# Patient Record
Sex: FEMALE | Race: WHITE | Employment: FULL TIME | URBAN - METROPOLITAN AREA
[De-identification: names, ages, dates, MRNs, and addresses within clinical notes are randomized per-mention and may not be internally consistent; named-entity substitution may affect disease eponyms.]

---

## 2017-01-17 RX ORDER — FLUOXETINE 10 MG/1
TABLET, FILM COATED ORAL
Qty: 90 TABLET | Refills: 0 | Status: SHIPPED | OUTPATIENT
Start: 2017-01-17 | End: 2017-06-19 | Stop reason: ALTCHOICE

## 2017-02-27 ENCOUNTER — TELEPHONE (OUTPATIENT)
Dept: INTERNAL MEDICINE CLINIC | Facility: CLINIC | Age: 22
End: 2017-02-27

## 2017-05-01 RX ORDER — NORETHINDRONE ACETATE AND ETHINYL ESTRADIOL AND FERROUS FUMARATE 1MG-20(21)
KIT ORAL
Qty: 28 TABLET | Refills: 0 | Status: SHIPPED | OUTPATIENT
Start: 2017-05-01 | End: 2017-05-28

## 2017-05-30 RX ORDER — NORETHINDRONE ACETATE AND ETHINYL ESTRADIOL 1MG-20(21)
1 KIT ORAL
Qty: 28 TABLET | Refills: 0 | Status: SHIPPED | OUTPATIENT
Start: 2017-05-30 | End: 2017-06-19 | Stop reason: ALTCHOICE

## 2017-05-30 RX ORDER — NORETHINDRONE ACETATE AND ETHINYL ESTRADIOL AND FERROUS FUMARATE 1MG-20(21)
KIT ORAL
Qty: 28 TABLET | Refills: 5 | Status: SHIPPED | OUTPATIENT
Start: 2017-05-30 | End: 2017-06-19

## 2017-05-30 NOTE — TELEPHONE ENCOUNTER
Pt's mother is requesting refill on her Birth control pill. Mother will inform daughter to call in to make an appt.

## 2017-06-11 ENCOUNTER — HOSPITAL ENCOUNTER (OUTPATIENT)
Age: 22
Discharge: HOME | End: 2017-06-11
Payer: MEDICAID

## 2017-06-11 VITALS
OXYGEN SATURATION: 100 % | HEART RATE: 98 BPM | TEMPERATURE: 98 F | RESPIRATION RATE: 20 BRPM | HEIGHT: 64 IN | SYSTOLIC BLOOD PRESSURE: 106 MMHG | DIASTOLIC BLOOD PRESSURE: 58 MMHG | WEIGHT: 110 LBS | BODY MASS INDEX: 18.78 KG/M2

## 2017-06-11 DIAGNOSIS — J02.0 STREP PHARYNGITIS: Primary | ICD-10-CM

## 2017-06-11 PROCEDURE — 87430 STREP A AG IA: CPT

## 2017-06-11 PROCEDURE — 99213 OFFICE O/P EST LOW 20 MIN: CPT

## 2017-06-11 PROCEDURE — 99214 OFFICE O/P EST MOD 30 MIN: CPT

## 2017-06-11 RX ORDER — AMOXICILLIN 875 MG/1
875 TABLET, COATED ORAL 2 TIMES DAILY
Qty: 20 TABLET | Refills: 0 | Status: SHIPPED | OUTPATIENT
Start: 2017-06-11 | End: 2017-06-19 | Stop reason: ALTCHOICE

## 2017-06-11 RX ORDER — DEXAMETHASONE 4 MG/1
8 TABLET ORAL ONCE
Status: COMPLETED | OUTPATIENT
Start: 2017-06-11 | End: 2017-06-11

## 2017-06-11 NOTE — ED PROVIDER NOTES
Patient Seen in: 5 [facility]    History   Patient presents with:  Sore Throat  Cough/URI    Stated Complaint: Cough, Sore Throat    HPI    Patient is a 28-year-old female who presents for evaluation of sore throat ×4-5 day Status: Never Used                        Alcohol Use: No                Review of Systems   Constitutional: Negative. HENT: Positive for sore throat. Respiratory: Positive for cough. Cardiovascular: Negative. Gastrointestinal: Negative. decadron here. Amoxicillin dispensed. Take medication as directed. Warm salt water gargles. Tylenol or Motrin as needed for pain\fever. Change out toothbrush after taking antibiotic for 24 hours. Cover mouth when coughing or sneezing.   Wash hands often

## 2017-06-11 NOTE — ED INITIAL ASSESSMENT (HPI)
REPORTS SORE THROAT SINCE Wednesday. NOW C/O INTERMITTENTLY PRODUCTIVE COUGH AS WELL. DENIES FEVERS AT HOME. REPORTS UPPER CHEST DISCOMFORT ASSOCIATED WITH HER COUGH.

## 2017-06-19 ENCOUNTER — OFFICE VISIT (OUTPATIENT)
Dept: INTERNAL MEDICINE CLINIC | Facility: CLINIC | Age: 22
End: 2017-06-19

## 2017-06-19 VITALS
SYSTOLIC BLOOD PRESSURE: 110 MMHG | HEART RATE: 69 BPM | BODY MASS INDEX: 19.06 KG/M2 | HEIGHT: 64.5 IN | DIASTOLIC BLOOD PRESSURE: 74 MMHG | RESPIRATION RATE: 17 BRPM | WEIGHT: 113 LBS | OXYGEN SATURATION: 99 %

## 2017-06-19 DIAGNOSIS — Z30.41 USES ORAL CONTRACEPTION: Primary | ICD-10-CM

## 2017-06-19 PROCEDURE — 81025 URINE PREGNANCY TEST: CPT | Performed by: FAMILY MEDICINE

## 2017-06-19 PROCEDURE — 87591 N.GONORRHOEAE DNA AMP PROB: CPT | Performed by: FAMILY MEDICINE

## 2017-06-19 PROCEDURE — 99213 OFFICE O/P EST LOW 20 MIN: CPT | Performed by: FAMILY MEDICINE

## 2017-06-19 PROCEDURE — 87491 CHLMYD TRACH DNA AMP PROBE: CPT | Performed by: FAMILY MEDICINE

## 2017-06-19 RX ORDER — NORETHINDRONE ACETATE AND ETHINYL ESTRADIOL 1MG-20(21)
1 KIT ORAL
Qty: 28 TABLET | Refills: 11 | Status: SHIPPED | OUTPATIENT
Start: 2017-06-19 | End: 2018-05-02

## 2017-06-19 NOTE — PROGRESS NOTES
CC:  Contraception      Hx of CC:    Here for refill birth control  Using condoms  Periods light and short on pill  No complaints on pill  Has had HPV     Goes to depaul      Allergies:  No Known Allergies   Current Meds:    Current Outpatient Prescription oral contraception  Is on last week of pill pack  1 yr refill  Needs pap next year  Encouraged cont to use condoms   - Norethin Ace-Eth Estrad-FE (MICROGESTIN FE 1/20) 1-20 MG-MCG Oral Tab; Take 1 tablet by mouth once daily. Dispense: 28 tablet;  Refill: 1

## 2018-04-04 ENCOUNTER — OFFICE VISIT (OUTPATIENT)
Dept: INTERNAL MEDICINE CLINIC | Facility: CLINIC | Age: 23
End: 2018-04-04

## 2018-04-04 VITALS
BODY MASS INDEX: 19.93 KG/M2 | SYSTOLIC BLOOD PRESSURE: 100 MMHG | DIASTOLIC BLOOD PRESSURE: 58 MMHG | TEMPERATURE: 98 F | HEART RATE: 61 BPM | WEIGHT: 118.19 LBS | HEIGHT: 64.5 IN | OXYGEN SATURATION: 100 %

## 2018-04-04 DIAGNOSIS — L70.9 ACNE, UNSPECIFIED ACNE TYPE: Primary | ICD-10-CM

## 2018-04-04 PROCEDURE — 99213 OFFICE O/P EST LOW 20 MIN: CPT | Performed by: INTERNAL MEDICINE

## 2018-04-04 RX ORDER — DOXYCYCLINE HYCLATE 100 MG
100 TABLET ORAL 2 TIMES DAILY
Qty: 60 TABLET | Refills: 1 | Status: SHIPPED | OUTPATIENT
Start: 2018-04-04 | End: 2018-05-02

## 2018-04-04 NOTE — PATIENT INSTRUCTIONS
Controlling Adult or Adolescent Acne     Look for water-based, oil-free skin care products. These are less likely to clog your pores. You stand the best chance of controlling your acne if you follow your treatment plan. Be patient.  Acne often takes mon · Don’t squeeze or pick blemishes. Acne blemishes may heal on their own. But squeezing can cause scarring. Scars will remain after acne blemishes heal.  · Sponges, brushes, or other abrasive tools can irritate the skin. Avoid using them.   · If you use soft 2. Follow the treatment plan advised by your healthcare provider. It usually takes 2 to 3 months to see a result. 3. Switching from oil-based to water-based makeup may improve acne in girls.   Follow-up care  Follow up with your healthcare provider, or as

## 2018-04-04 NOTE — PROGRESS NOTES
Candy Mathews is a 25year old female.     Chief complaint: acne     HPI:   25year old female with PMh as listed below here for   Acne  Couple of years   Had used topical treatment before but didn't improve and made it worse   Has been using Proactiv apparent distress  SKIN: no rashes,no suspicious lesions  HEENT: atraumatic, normocephalic,ears and throat are clear  NECK: supple,no adenopathy  LUNGS: clear to auscultation  CARDIO: RRR without murmur  GI: good BS's,no masses, HSM or tenderness  EXTREMIT

## 2018-05-02 ENCOUNTER — TELEPHONE (OUTPATIENT)
Dept: INTERNAL MEDICINE CLINIC | Facility: CLINIC | Age: 23
End: 2018-05-02

## 2018-05-02 ENCOUNTER — OFFICE VISIT (OUTPATIENT)
Dept: INTERNAL MEDICINE CLINIC | Facility: CLINIC | Age: 23
End: 2018-05-02

## 2018-05-02 VITALS
BODY MASS INDEX: 19.73 KG/M2 | WEIGHT: 117 LBS | HEIGHT: 64.5 IN | TEMPERATURE: 98 F | SYSTOLIC BLOOD PRESSURE: 102 MMHG | HEART RATE: 79 BPM | DIASTOLIC BLOOD PRESSURE: 58 MMHG | OXYGEN SATURATION: 100 %

## 2018-05-02 DIAGNOSIS — F41.9 ANXIETY: ICD-10-CM

## 2018-05-02 DIAGNOSIS — Z00.00 ANNUAL PHYSICAL EXAM: Primary | ICD-10-CM

## 2018-05-02 DIAGNOSIS — Z12.4 SCREENING FOR CERVICAL CANCER: ICD-10-CM

## 2018-05-02 DIAGNOSIS — N83.209 CYST OF OVARY, UNSPECIFIED LATERALITY: ICD-10-CM

## 2018-05-02 DIAGNOSIS — Z30.41 USES ORAL CONTRACEPTION: ICD-10-CM

## 2018-05-02 DIAGNOSIS — D64.9 ANEMIA, UNSPECIFIED TYPE: Primary | ICD-10-CM

## 2018-05-02 DIAGNOSIS — D64.9 ANEMIA, UNSPECIFIED TYPE: ICD-10-CM

## 2018-05-02 PROCEDURE — 85025 COMPLETE CBC W/AUTO DIFF WBC: CPT | Performed by: INTERNAL MEDICINE

## 2018-05-02 PROCEDURE — 84443 ASSAY THYROID STIM HORMONE: CPT | Performed by: INTERNAL MEDICINE

## 2018-05-02 PROCEDURE — 82607 VITAMIN B-12: CPT | Performed by: INTERNAL MEDICINE

## 2018-05-02 PROCEDURE — 99395 PREV VISIT EST AGE 18-39: CPT | Performed by: INTERNAL MEDICINE

## 2018-05-02 PROCEDURE — 80048 BASIC METABOLIC PNL TOTAL CA: CPT | Performed by: INTERNAL MEDICINE

## 2018-05-02 PROCEDURE — 84466 ASSAY OF TRANSFERRIN: CPT | Performed by: INTERNAL MEDICINE

## 2018-05-02 PROCEDURE — 82728 ASSAY OF FERRITIN: CPT | Performed by: INTERNAL MEDICINE

## 2018-05-02 PROCEDURE — 87591 N.GONORRHOEAE DNA AMP PROB: CPT | Performed by: INTERNAL MEDICINE

## 2018-05-02 PROCEDURE — 80061 LIPID PANEL: CPT | Performed by: INTERNAL MEDICINE

## 2018-05-02 PROCEDURE — 82746 ASSAY OF FOLIC ACID SERUM: CPT | Performed by: INTERNAL MEDICINE

## 2018-05-02 PROCEDURE — 83540 ASSAY OF IRON: CPT | Performed by: INTERNAL MEDICINE

## 2018-05-02 PROCEDURE — 87491 CHLMYD TRACH DNA AMP PROBE: CPT | Performed by: INTERNAL MEDICINE

## 2018-05-02 RX ORDER — NORETHINDRONE ACETATE AND ETHINYL ESTRADIOL 1MG-20(21)
1 KIT ORAL
Qty: 28 TABLET | Refills: 11 | Status: SHIPPED | OUTPATIENT
Start: 2018-05-02 | End: 2019-04-25

## 2018-05-02 RX ORDER — ALPRAZOLAM 0.25 MG/1
0.25 TABLET ORAL EVERY 6 HOURS PRN
Qty: 30 TABLET | Refills: 1 | Status: SHIPPED | OUTPATIENT
Start: 2018-05-02 | End: 2018-09-09

## 2018-05-02 RX ORDER — DOXYCYCLINE HYCLATE 100 MG
100 TABLET ORAL 2 TIMES DAILY
Qty: 60 TABLET | Refills: 1 | Status: SHIPPED | OUTPATIENT
Start: 2018-05-02 | End: 2018-08-08

## 2018-05-02 RX ORDER — FLUOXETINE 20 MG/1
20 TABLET, FILM COATED ORAL DAILY
Qty: 90 TABLET | Refills: 0 | Status: SHIPPED | OUTPATIENT
Start: 2018-05-02 | End: 2018-05-15

## 2018-05-15 ENCOUNTER — TELEPHONE (OUTPATIENT)
Dept: INTERNAL MEDICINE CLINIC | Facility: CLINIC | Age: 23
End: 2018-05-15

## 2018-05-15 DIAGNOSIS — N83.209 CYST OF OVARY, UNSPECIFIED LATERALITY: Primary | ICD-10-CM

## 2018-05-15 NOTE — TELEPHONE ENCOUNTER
PA REQUEST FOR FLUOXETINE- PER DR garcia changing med to Sertraline 50 mg PO Daily- [facility]

## 2018-05-30 ENCOUNTER — HOSPITAL ENCOUNTER (OUTPATIENT)
Dept: ULTRASOUND IMAGING | Facility: HOSPITAL | Age: 23
Discharge: HOME | End: 2018-05-30
Attending: INTERNAL MEDICINE
Payer: MEDICAID

## 2018-05-30 DIAGNOSIS — N83.209 CYST OF OVARY, UNSPECIFIED LATERALITY: ICD-10-CM

## 2018-05-30 PROCEDURE — 76830 TRANSVAGINAL US NON-OB: CPT | Performed by: INTERNAL MEDICINE

## 2018-05-30 PROCEDURE — 76856 US EXAM PELVIC COMPLETE: CPT | Performed by: INTERNAL MEDICINE

## 2018-06-14 ENCOUNTER — TELEPHONE (OUTPATIENT)
Dept: INTERNAL MEDICINE CLINIC | Facility: CLINIC | Age: 23
End: 2018-06-14

## 2018-06-14 NOTE — TELEPHONE ENCOUNTER
Mother stated that the daughter was experiencing the nausea and vomiting before she left for [facility] but they did not call or schedule appointment to address

## 2018-06-14 NOTE — TELEPHONE ENCOUNTER
Patient mother calling, patient is in [facility] experiencing nausea and vomiting. Advised she should be seen in [facility] for the nausea and vomiting as we cannot properly diagnose without seeing her.  Nausea and  Vomiting are side effects for every medications and

## 2018-07-05 ENCOUNTER — TELEPHONE (OUTPATIENT)
Dept: INTERNAL MEDICINE CLINIC | Facility: CLINIC | Age: 23
End: 2018-07-05

## 2018-07-05 NOTE — TELEPHONE ENCOUNTER
Pt named above mother Judson Gonzalez is calling again in reason to a previous message for a medication change to pt ALPRAZolam 0.25 MG over to [address] and from a tablet to a capsule. pt made aware that  will be informed and may not be able to get prescr

## 2018-07-05 NOTE — TELEPHONE ENCOUNTER
Pt named above mother Shalini Camargo is calling for a refill on ALPRAZolam 0.25 MG, but in capsules not tablet. States that her daughter is completely out and can no longer afford the medication through [facility] because her insurance is no longer covering it.  Try

## 2018-07-09 RX ORDER — ALPRAZOLAM 0.25 MG/1
0.25 TABLET ORAL EVERY 6 HOURS PRN
Qty: 45 TABLET | Refills: 0 | Status: SHIPPED | OUTPATIENT
Start: 2018-07-09 | End: 2018-08-08

## 2018-07-09 NOTE — TELEPHONE ENCOUNTER
Called the patients mother Terrance Counter no answer VM left   prescription was sent this morning to the needed pharmacy and left a vm that is not available in capsule form only tabs

## 2018-07-09 NOTE — TELEPHONE ENCOUNTER
PLEASE change prescription to capsules and it needs to go to the  [facility] due to cost.  Patients mom upset and wants the prescription today.

## 2018-07-09 NOTE — TELEPHONE ENCOUNTER
Placed the order in   Can you please call the patient and check with her if that what she wants since mom is requesting medication and she is 25year old

## 2018-07-10 ENCOUNTER — TELEPHONE (OUTPATIENT)
Dept: INTERNAL MEDICINE CLINIC | Facility: CLINIC | Age: 23
End: 2018-07-10

## 2018-07-10 RX ORDER — FLUOXETINE HYDROCHLORIDE 20 MG/1
20 CAPSULE ORAL DAILY
Qty: 90 CAPSULE | Refills: 0 | Status: SHIPPED | OUTPATIENT
Start: 2018-07-10 | End: 2018-10-12

## 2018-07-10 NOTE — TELEPHONE ENCOUNTER
Pt's mother Marcelo Almaraz called request RX for Fluoxetine 20 MG capsules. Takes 1 tablet daily Pharmacy: [address] [facility] in [facility] [address] Per mother at [address] medication  is $4. For 1 month supply.  Please send rx

## 2018-08-08 NOTE — PROGRESS NOTES
Ameena Razo is a 25year old female.     Chief complaint: follow up on acne  , depression and anxiety and medication refill     HPI:     25year old female with PMH as listed below here for   Medication refill and follow up on anxiety and depression Disorder Neg      Patient Active Problem List:     Routine infant or child health check     Acne     Myopia with astigmatism      REVIEW OF SYSTEMS:   A comprehensive 10 point review of systems was completed.   Pertinent positives and negatives noted in the

## 2018-08-09 ENCOUNTER — TELEPHONE (OUTPATIENT)
Dept: INTERNAL MEDICINE CLINIC | Facility: CLINIC | Age: 23
End: 2018-08-09

## 2018-08-09 NOTE — TELEPHONE ENCOUNTER
Which shots does she need ? We need either to review the records or she needs to ask her pediatrician which shots she is missing please ask the patient.

## 2018-09-04 ENCOUNTER — HOSPITAL ENCOUNTER (OUTPATIENT)
Age: 23
Discharge: HOME | End: 2018-09-04
Payer: MEDICAID

## 2018-09-04 VITALS
OXYGEN SATURATION: 99 % | BODY MASS INDEX: 18.1 KG/M2 | TEMPERATURE: 97 F | HEART RATE: 73 BPM | WEIGHT: 106 LBS | RESPIRATION RATE: 18 BRPM | DIASTOLIC BLOOD PRESSURE: 53 MMHG | SYSTOLIC BLOOD PRESSURE: 94 MMHG | HEIGHT: 64 IN

## 2018-09-04 DIAGNOSIS — R59.1 LYMPHADENOPATHY: Primary | ICD-10-CM

## 2018-09-04 LAB
#LYMPHOCYTE IC: 1.8 X10ˆ3/UL (ref 0.9–3.2)
#MXD IC: 0.3 X10ˆ3/UL (ref 0.1–1)
#NEUTROPHIL IC: 5.5 X10ˆ3/UL (ref 1.3–6.7)
HCT IC: 30.9 % (ref 37–54)
HGB IC: 10.5 G/DL (ref 12–16)
LYMPHOCYTES NFR BLD AUTO: 23.1 %
MCH IC: 29.7 PG (ref 27–33.2)
MCHC IC: 34 G/DL (ref 31–37)
MCV IC: 87.5 FL (ref 81–100)
MIXED CELL %: 4.6 %
NEUTROPHILS NFR BLD AUTO: 72.3 %
PLT IC: 299 X10ˆ3/UL (ref 150–450)
RBC IC: 3.53 X10ˆ6/UL (ref 3.8–5.1)
S PYO AG THROAT QL: NEGATIVE
WBC IC: 7.6 X10ˆ3/UL (ref 4–13)

## 2018-09-04 PROCEDURE — 36415 COLL VENOUS BLD VENIPUNCTURE: CPT

## 2018-09-04 PROCEDURE — 86308 HETEROPHILE ANTIBODY SCREEN: CPT | Performed by: NURSE PRACTITIONER

## 2018-09-04 PROCEDURE — 99213 OFFICE O/P EST LOW 20 MIN: CPT

## 2018-09-04 PROCEDURE — 85025 COMPLETE CBC W/AUTO DIFF WBC: CPT | Performed by: NURSE PRACTITIONER

## 2018-09-04 PROCEDURE — 87430 STREP A AG IA: CPT

## 2018-09-04 PROCEDURE — 99214 OFFICE O/P EST MOD 30 MIN: CPT

## 2018-09-04 RX ORDER — AMOXICILLIN AND CLAVULANATE POTASSIUM 875; 125 MG/1; MG/1
1 TABLET, FILM COATED ORAL 2 TIMES DAILY
Qty: 20 TABLET | Refills: 0 | Status: SHIPPED | OUTPATIENT
Start: 2018-09-04 | End: 2018-09-14

## 2018-09-04 NOTE — ED PROVIDER NOTES
Patient presents with:  Sore Throat      HPI:     Tatiana Kim is a 25year old female who presents for evaluation of a chief complaint of right-sided neck lymphadenopathy that she noticed a couple days ago.   She states the lymph node is uncomfortab good skin turgor, no obvious rashes  NECK: supple, right sided neck lymphadenopathy. Mobile. Mild discomfort with palpation. No visible redness.    CARDIO: RRR without murmur  EXTREMITIES: no cyanosis, clubbing or edema  GI: soft, non-tender, normal bowel s the course of Augmentin and if the Monospot is negative, she will need further testing done at that time including a possible biopsy. Diagnosis:    ICD-10-CM    1. Lymphadenopathy R59.1        All results reviewed and discussed with patient.   See AVS fo

## 2018-09-05 LAB — HETEROPH AB SER QL: NEGATIVE

## 2018-09-12 RX ORDER — ALPRAZOLAM 0.25 MG/1
TABLET ORAL
Qty: 45 TABLET | Refills: 0 | Status: SHIPPED | OUTPATIENT
Start: 2018-09-12 | End: 2018-10-31

## 2018-10-15 RX ORDER — FLUOXETINE HYDROCHLORIDE 20 MG/1
CAPSULE ORAL
Qty: 90 CAPSULE | Refills: 1 | Status: SHIPPED | OUTPATIENT
Start: 2018-10-15 | End: 2018-11-28

## 2018-10-31 ENCOUNTER — TELEPHONE (OUTPATIENT)
Dept: INTERNAL MEDICINE CLINIC | Facility: CLINIC | Age: 23
End: 2018-10-31

## 2018-11-05 RX ORDER — ALPRAZOLAM 0.25 MG/1
TABLET ORAL
Qty: 90 TABLET | Refills: 0 | Status: SHIPPED | OUTPATIENT
Start: 2018-11-05 | End: 2018-11-07

## 2018-11-07 ENCOUNTER — TELEPHONE (OUTPATIENT)
Dept: INTERNAL MEDICINE CLINIC | Facility: CLINIC | Age: 23
End: 2018-11-07

## 2018-11-07 RX ORDER — ALPRAZOLAM 0.25 MG/1
TABLET ORAL
Qty: 90 TABLET | Refills: 0 | Status: SHIPPED | OUTPATIENT
Start: 2018-11-07 | End: 2018-11-28

## 2018-11-08 RX ORDER — DOXYCYCLINE HYCLATE 100 MG
TABLET ORAL
Qty: 90 TABLET | Refills: 0 | Status: SHIPPED | OUTPATIENT
Start: 2018-11-08 | End: 2019-07-02 | Stop reason: ALTCHOICE

## 2018-11-28 ENCOUNTER — OFFICE VISIT (OUTPATIENT)
Dept: INTERNAL MEDICINE CLINIC | Facility: CLINIC | Age: 23
End: 2018-11-28
Payer: MEDICAID

## 2018-11-28 VITALS
SYSTOLIC BLOOD PRESSURE: 100 MMHG | DIASTOLIC BLOOD PRESSURE: 50 MMHG | BODY MASS INDEX: 17.37 KG/M2 | HEART RATE: 81 BPM | RESPIRATION RATE: 15 BRPM | HEIGHT: 64.5 IN | OXYGEN SATURATION: 99 % | WEIGHT: 103 LBS

## 2018-11-28 DIAGNOSIS — F32.A ANXIETY AND DEPRESSION: ICD-10-CM

## 2018-11-28 DIAGNOSIS — Z76.0 MEDICATION REFILL: Primary | ICD-10-CM

## 2018-11-28 DIAGNOSIS — L70.9 ACNE, UNSPECIFIED ACNE TYPE: ICD-10-CM

## 2018-11-28 DIAGNOSIS — R53.83 OTHER FATIGUE: ICD-10-CM

## 2018-11-28 DIAGNOSIS — D64.9 ANEMIA, UNSPECIFIED TYPE: ICD-10-CM

## 2018-11-28 DIAGNOSIS — F41.9 ANXIETY AND DEPRESSION: ICD-10-CM

## 2018-11-28 DIAGNOSIS — D50.9 IRON DEFICIENCY ANEMIA, UNSPECIFIED IRON DEFICIENCY ANEMIA TYPE: ICD-10-CM

## 2018-11-28 PROCEDURE — 99214 OFFICE O/P EST MOD 30 MIN: CPT | Performed by: INTERNAL MEDICINE

## 2018-11-28 PROCEDURE — 83540 ASSAY OF IRON: CPT | Performed by: INTERNAL MEDICINE

## 2018-11-28 PROCEDURE — 84466 ASSAY OF TRANSFERRIN: CPT | Performed by: INTERNAL MEDICINE

## 2018-11-28 PROCEDURE — 82306 VITAMIN D 25 HYDROXY: CPT | Performed by: INTERNAL MEDICINE

## 2018-11-28 PROCEDURE — 82784 ASSAY IGA/IGD/IGG/IGM EACH: CPT | Performed by: INTERNAL MEDICINE

## 2018-11-28 PROCEDURE — 82728 ASSAY OF FERRITIN: CPT | Performed by: INTERNAL MEDICINE

## 2018-11-28 PROCEDURE — 83516 IMMUNOASSAY NONANTIBODY: CPT | Performed by: INTERNAL MEDICINE

## 2018-11-28 PROCEDURE — 85025 COMPLETE CBC W/AUTO DIFF WBC: CPT | Performed by: INTERNAL MEDICINE

## 2018-11-28 RX ORDER — ALPRAZOLAM 0.25 MG/1
TABLET ORAL
Qty: 90 TABLET | Refills: 0 | Status: SHIPPED | OUTPATIENT
Start: 2018-12-07 | End: 2019-02-22

## 2018-11-28 RX ORDER — FLUOXETINE HYDROCHLORIDE 20 MG/1
CAPSULE ORAL
Qty: 90 CAPSULE | Refills: 3 | Status: SHIPPED | OUTPATIENT
Start: 2018-11-28 | End: 2019-06-26

## 2018-11-28 NOTE — PROGRESS NOTES
Jassi James is a 25year old female.     Chief complaint:   follow up on depression , medication refill, fatigue , acne  HPI:     25year old female with PMH as listed below here for   Follow up on depression  Feeling much better   No si or  HI   On Passed away due to an accident in 1997   • Diabetes Neg         NG: No family history of diabetes    • Heart Attack Neg         NG: No history of early cardiac disease/sudden death    • Glaucoma Neg    • Macular degeneration Neg    • Heart Disorder Neg type  Continue doxy     4. Medication refill  Refilled prozac and xanax       5.  Anxiety and depression  Continue prozac   Xanax refill given     Please return to the clinic if you are having persistent or worsening symptoms   Efren Jules MD,   Diplomat

## 2018-12-03 RX ORDER — ERGOCALCIFEROL 1.25 MG/1
50000 CAPSULE ORAL WEEKLY
Qty: 12 CAPSULE | Refills: 1 | Status: SHIPPED | OUTPATIENT
Start: 2018-12-03 | End: 2018-12-15

## 2018-12-10 ENCOUNTER — LAB ENCOUNTER (OUTPATIENT)
Dept: LAB | Facility: HOSPITAL | Age: 23
End: 2018-12-10
Attending: INTERNAL MEDICINE
Payer: MEDICAID

## 2018-12-10 ENCOUNTER — OFFICE VISIT (OUTPATIENT)
Dept: HEMATOLOGY/ONCOLOGY | Facility: HOSPITAL | Age: 23
End: 2018-12-10
Attending: INTERNAL MEDICINE
Payer: MEDICAID

## 2018-12-10 VITALS
HEIGHT: 64.5 IN | SYSTOLIC BLOOD PRESSURE: 105 MMHG | WEIGHT: 104 LBS | BODY MASS INDEX: 17.54 KG/M2 | HEART RATE: 70 BPM | DIASTOLIC BLOOD PRESSURE: 46 MMHG | TEMPERATURE: 98 F | RESPIRATION RATE: 16 BRPM

## 2018-12-10 DIAGNOSIS — D64.9 NORMOCYTIC ANEMIA: Primary | ICD-10-CM

## 2018-12-10 DIAGNOSIS — D64.9 NORMOCYTIC ANEMIA: ICD-10-CM

## 2018-12-10 PROCEDURE — 85025 COMPLETE CBC W/AUTO DIFF WBC: CPT

## 2018-12-10 PROCEDURE — 86039 ANTINUCLEAR ANTIBODIES (ANA): CPT

## 2018-12-10 PROCEDURE — 36415 COLL VENOUS BLD VENIPUNCTURE: CPT

## 2018-12-10 PROCEDURE — 99244 OFF/OP CNSLTJ NEW/EST MOD 40: CPT | Performed by: INTERNAL MEDICINE

## 2018-12-10 PROCEDURE — 85060 BLOOD SMEAR INTERPRETATION: CPT

## 2018-12-10 PROCEDURE — 86235 NUCLEAR ANTIGEN ANTIBODY: CPT

## 2018-12-10 PROCEDURE — 86225 DNA ANTIBODY NATIVE: CPT

## 2018-12-10 PROCEDURE — 83615 LACTATE (LD) (LDH) ENZYME: CPT

## 2018-12-10 PROCEDURE — 83010 ASSAY OF HAPTOGLOBIN QUANT: CPT

## 2018-12-10 PROCEDURE — 84550 ASSAY OF BLOOD/URIC ACID: CPT

## 2018-12-10 PROCEDURE — 86880 COOMBS TEST DIRECT: CPT

## 2018-12-10 PROCEDURE — 86038 ANTINUCLEAR ANTIBODIES: CPT

## 2018-12-10 PROCEDURE — 85045 AUTOMATED RETICULOCYTE COUNT: CPT

## 2018-12-11 NOTE — PROGRESS NOTES
Hematology Consultation Note    Patient Name: Angelita Arriola   YOB: 1995   Medical Record Number: [id number]   CSN: [id number]   Consulting Physician: Nichelle Reid MD  Referring Physician(s): Governor Joseph MD  Date of Consultation: 1 Blehpharitis, OU, 2011   • Chiari malformation [facility])     surgical decompression d/t severe headaches   • Eyelashes turned in     NG: Aberant lashes, OU, 9/26/11; Management: Removed in office, OU, 2011   • Infectious mononucleosis    • Keratitis 2011    NG Stress Concern: Not Asked        Weight Concern: Not Asked    Social History Narrative      Emily Barrera is single. She works part time at [employer] and goes to college full time 10 [employer].  She lives in [facility] 47.2 kg (104 lb)   BMI 17.58 kg/m²     Physical Examination:    General: Patient is alert and oriented x 3, not in acute distress. Psych: Anxious initially and then relaxed with cooperative questions and responses  HEENT: EOMs intact. Oropharynx is clear. chronic for the last two years; suspect this is related to natural menstrual blood loss with iron supplements from her birth control pill improving her iron results    --discussed with patient and her mother that her right neck lymphadenopathy episode in S the end of the consultation. Emotional Well Being:    The patient's emotional well being was assessed and resources were discussed. Appropriate resources were reviewed and discussed with the pateint and family.      I spent 60 minutes face to face with

## 2018-12-17 ENCOUNTER — TELEPHONE (OUTPATIENT)
Dept: HEMATOLOGY/ONCOLOGY | Facility: HOSPITAL | Age: 23
End: 2018-12-17

## 2018-12-17 NOTE — TELEPHONE ENCOUNTER
LM to please call our office back to review lab results and make f/u appt in 3 months with repeat CBC.

## 2018-12-19 ENCOUNTER — TELEPHONE (OUTPATIENT)
Dept: HEMATOLOGY/ONCOLOGY | Facility: HOSPITAL | Age: 23
End: 2018-12-19

## 2018-12-19 NOTE — TELEPHONE ENCOUNTER
Returned patient call- per Dr. Lamonte Montana labs results neg. LIZ positive- with reasonable LIZ titer- recommend talk to PCP and possible referral to Rheumatology for follow up. Patient stated understanding.   Follow up appointment scheduled for 3 months - davonte

## 2018-12-24 ENCOUNTER — OFFICE VISIT (OUTPATIENT)
Dept: INTERNAL MEDICINE CLINIC | Facility: CLINIC | Age: 23
End: 2018-12-24
Payer: MEDICAID

## 2018-12-24 VITALS
HEIGHT: 64.5 IN | HEART RATE: 71 BPM | OXYGEN SATURATION: 99 % | BODY MASS INDEX: 17.2 KG/M2 | DIASTOLIC BLOOD PRESSURE: 60 MMHG | WEIGHT: 102 LBS | SYSTOLIC BLOOD PRESSURE: 100 MMHG

## 2018-12-24 DIAGNOSIS — Z71.2 ENCOUNTER TO DISCUSS TEST RESULTS: ICD-10-CM

## 2018-12-24 DIAGNOSIS — R76.8 POSITIVE ANA (ANTINUCLEAR ANTIBODY): Primary | ICD-10-CM

## 2018-12-24 DIAGNOSIS — R11.2 NAUSEA AND VOMITING, INTRACTABILITY OF VOMITING NOT SPECIFIED, UNSPECIFIED VOMITING TYPE: ICD-10-CM

## 2018-12-24 PROCEDURE — 99213 OFFICE O/P EST LOW 20 MIN: CPT | Performed by: INTERNAL MEDICINE

## 2018-12-24 NOTE — PROGRESS NOTES
Angella Segura is a 25year old female.     Chief complaint:   discuss test results, need for referrals, vomiting     HPI:        25year old female with PMH as listed below here for   Discuss test results   Patient had seen gastro and hematology   Had History:  Social History    Tobacco Use      Smoking status: Never Smoker      Smokeless tobacco: Never Used    Alcohol use: No      Alcohol/week: 0.0 oz    Drug use: No       Family History   Problem Relation Age of Onset   • Other (Other) Father the [employer]

## 2018-12-26 ENCOUNTER — TELEPHONE (OUTPATIENT)
Dept: INTERNAL MEDICINE CLINIC | Facility: CLINIC | Age: 23
End: 2018-12-26

## 2018-12-26 DIAGNOSIS — R76.8 POSITIVE ANA (ANTINUCLEAR ANTIBODY): Primary | ICD-10-CM

## 2018-12-26 DIAGNOSIS — Z83.2 FAMILY HISTORY OF AUTOIMMUNE DISORDER: ICD-10-CM

## 2019-01-02 ENCOUNTER — OFFICE VISIT (OUTPATIENT)
Dept: RHEUMATOLOGY | Facility: CLINIC | Age: 24
End: 2019-01-02
Payer: MEDICAID

## 2019-01-02 ENCOUNTER — LAB ENCOUNTER (OUTPATIENT)
Dept: LAB | Facility: HOSPITAL | Age: 24
End: 2019-01-02
Attending: INTERNAL MEDICINE
Payer: MEDICAID

## 2019-01-02 VITALS
DIASTOLIC BLOOD PRESSURE: 55 MMHG | BODY MASS INDEX: 17.4 KG/M2 | HEIGHT: 64.5 IN | SYSTOLIC BLOOD PRESSURE: 95 MMHG | WEIGHT: 103.19 LBS | HEART RATE: 62 BPM

## 2019-01-02 DIAGNOSIS — R53.82 CHRONIC FATIGUE: ICD-10-CM

## 2019-01-02 DIAGNOSIS — R76.8 POSITIVE ANA (ANTINUCLEAR ANTIBODY): Primary | ICD-10-CM

## 2019-01-02 DIAGNOSIS — R76.8 POSITIVE ANA (ANTINUCLEAR ANTIBODY): ICD-10-CM

## 2019-01-02 LAB
ALBUMIN SERPL BCP-MCNC: 3.9 G/DL (ref 3.5–4.8)
ALBUMIN/GLOB SERPL: 1.4 {RATIO} (ref 1–2)
ALP SERPL-CCNC: 46 U/L (ref 32–100)
ALT SERPL-CCNC: 27 U/L (ref 14–54)
ANION GAP SERPL CALC-SCNC: 10 MMOL/L (ref 0–18)
APTT PPP: 27.9 SECONDS (ref 23.2–35.3)
AST SERPL-CCNC: 28 U/L (ref 15–41)
BILIRUB SERPL-MCNC: 0.4 MG/DL (ref 0.3–1.2)
BILIRUB UR QL: NEGATIVE
BUN SERPL-MCNC: 9 MG/DL (ref 8–20)
BUN/CREAT SERPL: 12.5 (ref 10–20)
C3 SERPL-MCNC: 93 MG/DL (ref 88–201)
C4 SERPL-MCNC: 21 MG/DL (ref 18–55)
CALCIUM SERPL-MCNC: 9 MG/DL (ref 8.5–10.5)
CHLORIDE SERPL-SCNC: 107 MMOL/L (ref 95–110)
CO2 SERPL-SCNC: 18 MMOL/L (ref 22–32)
COLOR UR: YELLOW
CONFIRM APTT STACLOT: NEGATIVE
CONFIRM DRVVT: 0.9 S (ref 0–1.1)
CREAT SERPL-MCNC: 0.72 MG/DL (ref 0.5–1.5)
CRP SERPL-MCNC: <0.5 MG/DL (ref 0–0.9)
ERYTHROCYTE [SEDIMENTATION RATE] IN BLOOD: 9 MM/HR (ref 0–20)
GLOBULIN PLAS-MCNC: 2.8 G/DL (ref 2.5–3.7)
GLUCOSE SERPL-MCNC: 108 MG/DL (ref 70–99)
GLUCOSE UR-MCNC: NEGATIVE MG/DL
HGB UR QL STRIP.AUTO: NEGATIVE
KETONES UR-MCNC: NEGATIVE MG/DL
NITRITE UR QL STRIP.AUTO: NEGATIVE
OSMOLALITY UR CALC.SUM OF ELEC: 279 MOSM/KG (ref 275–295)
PATIENT FASTING: YES
PH UR: 5 [PH] (ref 5–8)
POTASSIUM SERPL-SCNC: 3.7 MMOL/L (ref 3.3–5.1)
PROT SERPL-MCNC: 6.7 G/DL (ref 5.9–8.4)
PROT UR-MCNC: NEGATIVE MG/DL
PROTHROMBIN TIME: 14.2 SECONDS (ref 11.8–14.5)
RBC #/AREA URNS AUTO: 1 /HPF
RHEUMATOID FACT SER QL: <5 IU/ML
SODIUM SERPL-SCNC: 135 MMOL/L (ref 136–144)
SP GR UR STRIP: 1.02 (ref 1–1.03)
TSH SERPL-ACNC: 3.01 UIU/ML (ref 0.45–5.33)
UROBILINOGEN UR STRIP-ACNC: <2
VIT C UR-MCNC: NEGATIVE MG/DL
WBC #/AREA URNS AUTO: 3 /HPF

## 2019-01-02 PROCEDURE — 86618 LYME DISEASE ANTIBODY: CPT | Performed by: INTERNAL MEDICINE

## 2019-01-02 PROCEDURE — 99204 OFFICE O/P NEW MOD 45 MIN: CPT | Performed by: INTERNAL MEDICINE

## 2019-01-02 PROCEDURE — 86800 THYROGLOBULIN ANTIBODY: CPT

## 2019-01-02 PROCEDURE — 85613 RUSSELL VIPER VENOM DILUTED: CPT

## 2019-01-02 PROCEDURE — 86235 NUCLEAR ANTIGEN ANTIBODY: CPT

## 2019-01-02 PROCEDURE — 86147 CARDIOLIPIN ANTIBODY EA IG: CPT

## 2019-01-02 PROCEDURE — 36415 COLL VENOUS BLD VENIPUNCTURE: CPT

## 2019-01-02 PROCEDURE — 86663 EPSTEIN-BARR ANTIBODY: CPT

## 2019-01-02 PROCEDURE — 99212 OFFICE O/P EST SF 10 MIN: CPT | Performed by: INTERNAL MEDICINE

## 2019-01-02 PROCEDURE — 81001 URINALYSIS AUTO W/SCOPE: CPT | Performed by: INTERNAL MEDICINE

## 2019-01-02 PROCEDURE — 85598 HEXAGNAL PHOSPH PLTLT NEUTRL: CPT

## 2019-01-02 PROCEDURE — 85610 PROTHROMBIN TIME: CPT

## 2019-01-02 PROCEDURE — 80053 COMPREHEN METABOLIC PANEL: CPT | Performed by: INTERNAL MEDICINE

## 2019-01-02 PROCEDURE — 84165 PROTEIN E-PHORESIS SERUM: CPT

## 2019-01-02 PROCEDURE — 84443 ASSAY THYROID STIM HORMONE: CPT

## 2019-01-02 PROCEDURE — 86160 COMPLEMENT ANTIGEN: CPT | Performed by: INTERNAL MEDICINE

## 2019-01-02 PROCEDURE — 86140 C-REACTIVE PROTEIN: CPT | Performed by: INTERNAL MEDICINE

## 2019-01-02 PROCEDURE — 86146 BETA-2 GLYCOPROTEIN ANTIBODY: CPT | Performed by: INTERNAL MEDICINE

## 2019-01-02 PROCEDURE — 86376 MICROSOMAL ANTIBODY EACH: CPT

## 2019-01-02 PROCEDURE — 86200 CCP ANTIBODY: CPT | Performed by: INTERNAL MEDICINE

## 2019-01-02 PROCEDURE — 85390 FIBRINOLYSINS SCREEN I&R: CPT

## 2019-01-02 PROCEDURE — 86644 CMV ANTIBODY: CPT | Performed by: INTERNAL MEDICINE

## 2019-01-02 PROCEDURE — 86665 EPSTEIN-BARR CAPSID VCA: CPT

## 2019-01-02 PROCEDURE — 86747 PARVOVIRUS ANTIBODY: CPT | Performed by: INTERNAL MEDICINE

## 2019-01-02 PROCEDURE — 86645 CMV ANTIBODY IGM: CPT | Performed by: INTERNAL MEDICINE

## 2019-01-02 PROCEDURE — 86664 EPSTEIN-BARR NUCLEAR ANTIGEN: CPT

## 2019-01-02 PROCEDURE — 85730 THROMBOPLASTIN TIME PARTIAL: CPT

## 2019-01-02 PROCEDURE — 85652 RBC SED RATE AUTOMATED: CPT | Performed by: INTERNAL MEDICINE

## 2019-01-02 PROCEDURE — 86431 RHEUMATOID FACTOR QUANT: CPT | Performed by: INTERNAL MEDICINE

## 2019-01-02 RX ORDER — ERGOCALCIFEROL 1.25 MG/1
CAPSULE ORAL
Refills: 1 | COMMUNITY
Start: 2019-01-01 | End: 2020-10-22

## 2019-01-02 NOTE — PATIENT INSTRUCTIONS
- you were seen today for +LIZ going to do more blood work to r/o lupus  - also going to do some work up to r/o infectious process  - will have you f/u in 2 weeks

## 2019-01-02 NOTE — PROGRESS NOTES
Marquis Schaefer is a 26year old female who presents for Patient presents with:  Abnormal Labs: LIZ +   Fatigue: x 1 yr  Weight Loss: x 6 months  Nausea/vomiting: x 6 months  .    HPI:     I had the pleasure of seeing Marquis Schaefer on 1/2/2019 for evalu PE, miscarriages, serositis, myalgias, joint pain or swelling,  symptoms, rash, psoriasis, acid reflux, dysphagia, chest pain or myalgias. She does report intermittent shortness of breath when going up stairs.     She has a paternal grandmother with lupu history of diabetes    • Heart Attack Neg         NG: No history of early cardiac disease/sudden death    • Glaucoma Neg    • Macular degeneration Neg    • Heart Disorder Neg    • Bleeding Disorders Neg    • Clotting Disorder Neg    • Cancer Neg       Soci palpation  Elbow: FROM, no pain or swelling or warmth on palpation  Shoulders: FROM, no pain or swelling or warmth on palpation  Hip: normal log roll, no lateral hip pain, PADMINI test negative b/l  Knees: FROM, no warmth or effusion present.  No pain with RO 18    Monocytes %      % 4    Eosinophils %      % 1    Basophils %      % 0    Neutrophils Absolute      1.8 - 7.7 K/UL 6.2    Lymphocytes Absolute      1.0 - 4.0 K/UL 1.4    Monocytes Absolute      0.0 - 1.0 K/UL 0.3    Eosinophils Absolute      0.0 - 0.

## 2019-01-03 LAB
CARDIOLIPIN IGG SERPL-ACNC: 1.1 GPL (ref 0–14.9)
CARDIOLIPIN IGM SERPL-ACNC: 3.4 MPL (ref 0–12.4)
EBV AB TO EARLY (D) AG, IGG: 48.9 U/ML
PARVOVIRUS B19 ANTIBODY IGG: 0.26 IV
PARVOVIRUS B19 ANTIBODY IGM: 0.19 IV
SCLERODERMA (SCL-70) (ENA) AB, IGG: 2 AU/ML
THYROGLOBULIN AB: 2.2 IU/ML
THYROID PEROXIDASE (TPO) ANTIBODY: 4.6 IU/ML

## 2019-01-04 LAB
ALBUMIN SERPL ELPH-MCNC: 4.39 G/DL (ref 3.75–5.21)
ALBUMIN/GLOB SERPL: 1.51 {RATIO} (ref 1–2)
ALPHA1 GLOB SERPL ELPH-MCNC: 0.32 G/DL (ref 0.19–0.46)
ALPHA2 GLOB SERPL ELPH-MCNC: 0.77 G/DL (ref 0.48–1.05)
B BURGDOR IGG+IGM SER QL: NEGATIVE
B-GLOBULIN SERPL ELPH-MCNC: 0.75 G/DL (ref 0.68–1.23)
BETA 2 GLYCOPROTEIN 1 AB, IGG: <3.7 U/ML (ref ?–15)
BETA 2 GLYCOPROTEIN 1 AB, IGM: <3.7 U/ML (ref ?–15)
CCP IGG SERPL-ACNC: 1.6 U/ML (ref 0–6.9)
CMV IGG SERPL QL: NEGATIVE
CMV IGM SERPL QL: NEGATIVE
EBV NA IGG SER QL IA: POSITIVE
EBV VCA IGG SER QL IA: POSITIVE
EBV VCA IGM SER QL IA: NEGATIVE
GAMMA GLOB SERPL ELPH-MCNC: 1.07 G/DL (ref 0.62–1.7)
HISTONE AB, IGG: 0.7 UNITS
TOTAL PROTEIN (SPECIAL TESTING): 7.3 G/DL (ref 6.5–9.1)

## 2019-01-08 LAB
ENA SM IGG SER QL: NEGATIVE
ENA SM+RNP AB SER QL: NEGATIVE

## 2019-01-18 ENCOUNTER — OFFICE VISIT (OUTPATIENT)
Dept: RHEUMATOLOGY | Facility: CLINIC | Age: 24
End: 2019-01-18
Payer: MEDICAID

## 2019-01-18 VITALS
HEIGHT: 64.5 IN | DIASTOLIC BLOOD PRESSURE: 57 MMHG | BODY MASS INDEX: 16.7 KG/M2 | WEIGHT: 99 LBS | SYSTOLIC BLOOD PRESSURE: 96 MMHG | HEART RATE: 59 BPM

## 2019-01-18 DIAGNOSIS — B27.90 EBV INFECTION: ICD-10-CM

## 2019-01-18 DIAGNOSIS — R53.83 OTHER FATIGUE: ICD-10-CM

## 2019-01-18 DIAGNOSIS — R76.8 POSITIVE ANA (ANTINUCLEAR ANTIBODY): Primary | ICD-10-CM

## 2019-01-18 PROCEDURE — 99212 OFFICE O/P EST SF 10 MIN: CPT | Performed by: INTERNAL MEDICINE

## 2019-01-18 PROCEDURE — 99213 OFFICE O/P EST LOW 20 MIN: CPT | Performed by: INTERNAL MEDICINE

## 2019-01-18 NOTE — PROGRESS NOTES
Bernadette Brito is a 26year old female. HPI:   Patient presents with: Follow - Up: Labs discussion. Cannot gain weight . I had the pleasure of seeing Bernadette Brito on 1/18/2019 for follow up +LIZ, nausea and fatigue.      Current Medications:  Z stairs. Workup showed negative LIZ panel, normal C3 and C4, normal ESR and CRP, normal rheumatoid factor and CCP. Negative antiphospholipid panel.   She was found to have + EBV AG IgG, + EBV nuclear AG Ab, +EBV AB to early Ag IgG, negative EBV AB IgM HSM  SKIN: No rashes or skin lesions.  No nail findings  MSK:  Cervical spine: FROM  Hands: no synovitis in DIP, PIP and MCP, strong full fists  Wrist: FROM, no pain or swelling or warmth on palpation  Elbow: FROM, no pain or swelling or warmth on palpation Titer/Pattern      <80 640 (A)   Anti-Sjogren's A      Negative Negative   Anti-Sjogren's B      Negative Negative   Anti-Smith Antibody      Negative Negative   Anti-Thapa/RNP Antibody      Negative Negative     Component      Latest Ref Rng & Units 1/2/2 eyes.  In September she did develop left parotid/submandibular swelling, screening test for mono was negative. Further workup was done due to a + LIZ and all blood work for lupus, connective tissue disease and rheumatoid arthritis were negative.   She was

## 2019-01-22 ENCOUNTER — TELEPHONE (OUTPATIENT)
Dept: INTERNAL MEDICINE CLINIC | Facility: CLINIC | Age: 24
End: 2019-01-22

## 2019-01-22 NOTE — TELEPHONE ENCOUNTER
Pt's mother called and states her daughter need to see the GI that Dr Carolina Casey recommended again.  She's wondering if the referral Dr Carolina Casey wrote is good for multiple visits or if she needs a new referral.

## 2019-02-22 DIAGNOSIS — F41.9 ANXIETY AND DEPRESSION: ICD-10-CM

## 2019-02-22 DIAGNOSIS — L70.9 ACNE, UNSPECIFIED ACNE TYPE: ICD-10-CM

## 2019-02-22 DIAGNOSIS — R53.83 OTHER FATIGUE: ICD-10-CM

## 2019-02-22 DIAGNOSIS — Z76.0 MEDICATION REFILL: ICD-10-CM

## 2019-02-22 DIAGNOSIS — F32.A ANXIETY AND DEPRESSION: ICD-10-CM

## 2019-02-22 DIAGNOSIS — D64.9 ANEMIA, UNSPECIFIED TYPE: ICD-10-CM

## 2019-02-23 RX ORDER — ALPRAZOLAM 0.25 MG/1
TABLET ORAL
Qty: 90 TABLET | Refills: 0 | Status: SHIPPED | OUTPATIENT
Start: 2019-02-23 | End: 2019-04-08

## 2019-03-22 ENCOUNTER — APPOINTMENT (OUTPATIENT)
Dept: HEMATOLOGY/ONCOLOGY | Facility: HOSPITAL | Age: 24
End: 2019-03-22
Attending: INTERNAL MEDICINE
Payer: MEDICAID

## 2019-04-08 DIAGNOSIS — R53.83 OTHER FATIGUE: ICD-10-CM

## 2019-04-08 DIAGNOSIS — L70.9 ACNE, UNSPECIFIED ACNE TYPE: ICD-10-CM

## 2019-04-08 DIAGNOSIS — F32.A ANXIETY AND DEPRESSION: ICD-10-CM

## 2019-04-08 DIAGNOSIS — D64.9 ANEMIA, UNSPECIFIED TYPE: ICD-10-CM

## 2019-04-08 DIAGNOSIS — Z76.0 MEDICATION REFILL: ICD-10-CM

## 2019-04-08 DIAGNOSIS — F41.9 ANXIETY AND DEPRESSION: ICD-10-CM

## 2019-04-08 RX ORDER — ALPRAZOLAM 0.25 MG/1
TABLET ORAL
Qty: 90 TABLET | Refills: 0 | Status: SHIPPED | OUTPATIENT
Start: 2019-04-08 | End: 2019-05-27

## 2019-04-15 ENCOUNTER — TELEPHONE (OUTPATIENT)
Dept: HEMATOLOGY/ONCOLOGY | Facility: HOSPITAL | Age: 24
End: 2019-04-15

## 2019-04-15 NOTE — TELEPHONE ENCOUNTER
----- Message from Ira Webster RN sent at 4/12/2019  3:05 PM CDT -----  Regarding: missed her march appt  plse call pt and ask her to make a f/u appt with HW (missed her March appt) and go for the ordered CBC prior to the appt to check on her anemia.

## 2019-04-16 ENCOUNTER — TELEPHONE (OUTPATIENT)
Dept: HEMATOLOGY/ONCOLOGY | Facility: HOSPITAL | Age: 24
End: 2019-04-16

## 2019-04-16 NOTE — TELEPHONE ENCOUNTER
----- Message from Flako Berry RN sent at 4/15/2019 12:54 PM CDT -----  Regarding: call for f/u  plse call pt for f/u appt. Missed her appt in march.  Remind her to go for repeat cbc prior to appt   plse document the attempt  Supriya Soto

## 2019-04-23 ENCOUNTER — TELEPHONE (OUTPATIENT)
Dept: HEMATOLOGY/ONCOLOGY | Facility: HOSPITAL | Age: 24
End: 2019-04-23

## 2019-04-25 DIAGNOSIS — Z30.41 USES ORAL CONTRACEPTION: ICD-10-CM

## 2019-04-25 RX ORDER — NORETHINDRONE ACETATE AND ETHINYL ESTRADIOL AND FERROUS FUMARATE 1MG-20(21)
KIT ORAL
Qty: 28 TABLET | Refills: 0 | Status: SHIPPED | OUTPATIENT
Start: 2019-04-25 | End: 2019-05-22

## 2019-05-22 DIAGNOSIS — Z30.41 USES ORAL CONTRACEPTION: ICD-10-CM

## 2019-05-22 RX ORDER — NORETHINDRONE ACETATE AND ETHINYL ESTRADIOL AND FERROUS FUMARATE 1MG-20(21)
KIT ORAL
Qty: 28 TABLET | Refills: 0 | Status: SHIPPED | OUTPATIENT
Start: 2019-05-22 | End: 2019-06-18

## 2019-05-27 DIAGNOSIS — F41.9 ANXIETY AND DEPRESSION: ICD-10-CM

## 2019-05-27 DIAGNOSIS — F32.A ANXIETY AND DEPRESSION: ICD-10-CM

## 2019-05-27 DIAGNOSIS — D64.9 ANEMIA, UNSPECIFIED TYPE: ICD-10-CM

## 2019-05-27 DIAGNOSIS — Z76.0 MEDICATION REFILL: ICD-10-CM

## 2019-05-27 DIAGNOSIS — L70.9 ACNE, UNSPECIFIED ACNE TYPE: ICD-10-CM

## 2019-05-27 DIAGNOSIS — R53.83 OTHER FATIGUE: ICD-10-CM

## 2019-05-28 RX ORDER — ALPRAZOLAM 0.25 MG/1
TABLET ORAL
Qty: 90 TABLET | Refills: 0 | Status: SHIPPED | OUTPATIENT
Start: 2019-05-28 | End: 2019-06-26

## 2019-06-18 DIAGNOSIS — Z30.41 USES ORAL CONTRACEPTION: ICD-10-CM

## 2019-06-18 RX ORDER — NORETHINDRONE ACETATE AND ETHINYL ESTRADIOL AND FERROUS FUMARATE 1MG-20(21)
KIT ORAL
Qty: 28 TABLET | Refills: 0 | Status: SHIPPED | OUTPATIENT
Start: 2019-06-18 | End: 2019-06-26

## 2019-06-26 ENCOUNTER — OFFICE VISIT (OUTPATIENT)
Dept: INTERNAL MEDICINE CLINIC | Facility: CLINIC | Age: 24
End: 2019-06-26
Payer: MEDICAID

## 2019-06-26 VITALS
OXYGEN SATURATION: 100 % | WEIGHT: 99.19 LBS | TEMPERATURE: 98 F | HEART RATE: 78 BPM | RESPIRATION RATE: 17 BRPM | BODY MASS INDEX: 16.93 KG/M2 | DIASTOLIC BLOOD PRESSURE: 52 MMHG | HEIGHT: 64 IN | SYSTOLIC BLOOD PRESSURE: 98 MMHG

## 2019-06-26 DIAGNOSIS — L70.9 ACNE, UNSPECIFIED ACNE TYPE: ICD-10-CM

## 2019-06-26 DIAGNOSIS — Z30.41 USES ORAL CONTRACEPTION: ICD-10-CM

## 2019-06-26 DIAGNOSIS — N63.0 BREAST LUMP: ICD-10-CM

## 2019-06-26 DIAGNOSIS — Z76.0 MEDICATION REFILL: ICD-10-CM

## 2019-06-26 DIAGNOSIS — R92.2 DENSE BREAST: ICD-10-CM

## 2019-06-26 DIAGNOSIS — R53.83 OTHER FATIGUE: ICD-10-CM

## 2019-06-26 DIAGNOSIS — R11.2 NAUSEA AND VOMITING, INTRACTABILITY OF VOMITING NOT SPECIFIED, UNSPECIFIED VOMITING TYPE: ICD-10-CM

## 2019-06-26 DIAGNOSIS — F32.A ANXIETY AND DEPRESSION: ICD-10-CM

## 2019-06-26 DIAGNOSIS — R63.4 UNINTENTIONAL WEIGHT LOSS: ICD-10-CM

## 2019-06-26 DIAGNOSIS — Z00.00 ANNUAL PHYSICAL EXAM: Primary | ICD-10-CM

## 2019-06-26 DIAGNOSIS — D64.9 ANEMIA, UNSPECIFIED TYPE: ICD-10-CM

## 2019-06-26 DIAGNOSIS — F41.9 ANXIETY AND DEPRESSION: ICD-10-CM

## 2019-06-26 PROCEDURE — 87491 CHLMYD TRACH DNA AMP PROBE: CPT | Performed by: INTERNAL MEDICINE

## 2019-06-26 PROCEDURE — 81025 URINE PREGNANCY TEST: CPT | Performed by: INTERNAL MEDICINE

## 2019-06-26 PROCEDURE — 81003 URINALYSIS AUTO W/O SCOPE: CPT | Performed by: INTERNAL MEDICINE

## 2019-06-26 PROCEDURE — 87591 N.GONORRHOEAE DNA AMP PROB: CPT | Performed by: INTERNAL MEDICINE

## 2019-06-26 PROCEDURE — 99395 PREV VISIT EST AGE 18-39: CPT | Performed by: INTERNAL MEDICINE

## 2019-06-26 RX ORDER — ALPRAZOLAM 0.25 MG/1
0.25 TABLET ORAL NIGHTLY PRN
Qty: 90 TABLET | Refills: 0 | Status: SHIPPED | OUTPATIENT
Start: 2019-06-26 | End: 2019-08-24

## 2019-06-26 RX ORDER — NORETHINDRONE ACETATE AND ETHINYL ESTRADIOL 1MG-20(21)
1 KIT ORAL
Qty: 28 TABLET | Refills: 3 | Status: SHIPPED | OUTPATIENT
Start: 2019-06-26 | End: 2019-07-19

## 2019-06-26 RX ORDER — FLUOXETINE HYDROCHLORIDE 20 MG/1
CAPSULE ORAL
Qty: 90 CAPSULE | Refills: 3 | Status: SHIPPED | OUTPATIENT
Start: 2019-06-26 | End: 2019-11-08

## 2019-06-26 NOTE — PROGRESS NOTES
Lacey Felty is a 26year old female.     Chief complaint: annual physical exam     HPI:   Here for annual physical exam       Weight loss   unintentional   Diarrhea every once in a while   No back stool or blood in the stool   Feels like she has no moose Age of Onset   • Anemia Mother    • Other [facility]) Mother    • Other (lupus) Paternal Grandmother    • Other (RA) Paternal Grandmother    • Diabetes Neg         NG: No family history of diabetes    • Heart Attack Neg         NG: No history of early cardiac di Future  - CBC WITH DIFFERENTIAL WITH PLATELET; Future  - COMP METABOLIC PANEL (14); Future  - URINE PREGNANCY TEST  - URINALYSIS WITH CULTURE REFLEX  - CHLAMYDIA/GONOCOCCUS, FATIMAH  - FERRITIN; Future  - IRON AND TIBC; Future  - VITAMIN B12; Future    2.  Othe COMP METABOLIC PANEL (14); Future  - URINE PREGNANCY TEST  - URINALYSIS WITH CULTURE REFLEX  - CHLAMYDIA/GONOCOCCUS, FATIMAH  - VITAMIN B12; Future    5.  Anxiety and depression  continue prozac   - FLUoxetine HCl 20 MG Oral Cap; TAKE 1 CAPSULE BY MOUTH ONCE DA episodes of vomiting   Hx of lymphadenopathy   He recommended changing ct chest to ct chest abdomen and pelvis   Also discussed with the patient go to a university program per GI recommendation

## 2019-06-27 DIAGNOSIS — R63.4 UNINTENTIONAL WEIGHT LOSS: Primary | ICD-10-CM

## 2019-06-27 DIAGNOSIS — N63.0 BREAST LUMP: ICD-10-CM

## 2019-06-27 DIAGNOSIS — R92.2 DENSE BREAST: ICD-10-CM

## 2019-06-28 ENCOUNTER — LAB ENCOUNTER (OUTPATIENT)
Dept: LAB | Age: 24
End: 2019-06-28
Attending: INTERNAL MEDICINE
Payer: MEDICAID

## 2019-06-28 ENCOUNTER — TELEPHONE (OUTPATIENT)
Dept: RHEUMATOLOGY | Facility: CLINIC | Age: 24
End: 2019-06-28

## 2019-06-28 DIAGNOSIS — D64.9 NORMOCYTIC ANEMIA: ICD-10-CM

## 2019-06-28 DIAGNOSIS — Z76.0 MEDICATION REFILL: ICD-10-CM

## 2019-06-28 DIAGNOSIS — F41.9 ANXIETY AND DEPRESSION: ICD-10-CM

## 2019-06-28 DIAGNOSIS — D64.9 ANEMIA, UNSPECIFIED TYPE: ICD-10-CM

## 2019-06-28 DIAGNOSIS — Z30.41 USES ORAL CONTRACEPTION: ICD-10-CM

## 2019-06-28 DIAGNOSIS — R76.8 POSITIVE ANA (ANTINUCLEAR ANTIBODY): Primary | ICD-10-CM

## 2019-06-28 DIAGNOSIS — F32.A ANXIETY AND DEPRESSION: ICD-10-CM

## 2019-06-28 DIAGNOSIS — R53.83 OTHER FATIGUE: ICD-10-CM

## 2019-06-28 DIAGNOSIS — R53.82 CHRONIC FATIGUE: ICD-10-CM

## 2019-06-28 DIAGNOSIS — L70.9 ACNE, UNSPECIFIED ACNE TYPE: ICD-10-CM

## 2019-06-28 PROCEDURE — 36415 COLL VENOUS BLD VENIPUNCTURE: CPT

## 2019-06-28 PROCEDURE — 80061 LIPID PANEL: CPT

## 2019-06-28 PROCEDURE — 87389 HIV-1 AG W/HIV-1&-2 AB AG IA: CPT

## 2019-06-28 PROCEDURE — 80053 COMPREHEN METABOLIC PANEL: CPT

## 2019-06-28 PROCEDURE — 82533 TOTAL CORTISOL: CPT

## 2019-06-28 PROCEDURE — 83540 ASSAY OF IRON: CPT

## 2019-06-28 PROCEDURE — 84466 ASSAY OF TRANSFERRIN: CPT

## 2019-06-28 PROCEDURE — 82728 ASSAY OF FERRITIN: CPT

## 2019-06-28 PROCEDURE — 85025 COMPLETE CBC W/AUTO DIFF WBC: CPT

## 2019-06-28 NOTE — TELEPHONE ENCOUNTER
Pt requesting lab orders prior to f/u appt 7/19. Pt aware Dr. Wesley Mata will be back in office 7/8 and will order additional labs at that time.

## 2019-07-01 ENCOUNTER — HOSPITAL ENCOUNTER (OUTPATIENT)
Dept: ULTRASOUND IMAGING | Facility: HOSPITAL | Age: 24
Discharge: HOME | End: 2019-07-01
Attending: INTERNAL MEDICINE
Payer: MEDICAID

## 2019-07-01 ENCOUNTER — TELEPHONE (OUTPATIENT)
Dept: INTERNAL MEDICINE CLINIC | Facility: CLINIC | Age: 24
End: 2019-07-01

## 2019-07-01 ENCOUNTER — HOSPITAL ENCOUNTER (OUTPATIENT)
Dept: MAMMOGRAPHY | Facility: HOSPITAL | Age: 24
Discharge: HOME | End: 2019-07-01
Attending: INTERNAL MEDICINE
Payer: MEDICAID

## 2019-07-01 DIAGNOSIS — R63.4 UNINTENTIONAL WEIGHT LOSS: ICD-10-CM

## 2019-07-01 DIAGNOSIS — R92.2 DENSE BREAST: ICD-10-CM

## 2019-07-01 DIAGNOSIS — N63.0 BREAST LUMP: ICD-10-CM

## 2019-07-01 PROCEDURE — 76642 ULTRASOUND BREAST LIMITED: CPT | Performed by: INTERNAL MEDICINE

## 2019-07-01 NOTE — TELEPHONE ENCOUNTER
Chey called and states that Pt needs a prior Auth for an ultrasound that she already had done this morning.

## 2019-07-02 ENCOUNTER — OFFICE VISIT (OUTPATIENT)
Dept: HEMATOLOGY/ONCOLOGY | Facility: HOSPITAL | Age: 24
End: 2019-07-02
Attending: INTERNAL MEDICINE
Payer: MEDICAID

## 2019-07-02 VITALS
OXYGEN SATURATION: 100 % | HEART RATE: 98 BPM | HEIGHT: 64 IN | SYSTOLIC BLOOD PRESSURE: 105 MMHG | DIASTOLIC BLOOD PRESSURE: 52 MMHG | RESPIRATION RATE: 16 BRPM | TEMPERATURE: 98 F | WEIGHT: 95.38 LBS | BODY MASS INDEX: 16.28 KG/M2

## 2019-07-02 DIAGNOSIS — D64.9 NORMOCYTIC ANEMIA: Primary | ICD-10-CM

## 2019-07-02 PROCEDURE — 99213 OFFICE O/P EST LOW 20 MIN: CPT | Performed by: INTERNAL MEDICINE

## 2019-07-02 NOTE — TELEPHONE ENCOUNTER
DAYNE COTTER [facility] is calling regarding the prior auth for u/s of the breast   Call back number is [phone]

## 2019-07-02 NOTE — PROGRESS NOTES
Hematology Progress Note    Patient Name: Paramjit Long   YOB: 1995   Medical Record Number: [id number]   CSN: [id number]   Consulting Physician: Steven Aguila MD  Referring Physician(s): Edmund Augustine MD  Date of Visit: 07/02/2019 autoimmune phenomenon based on LIZ titer; patient recommended to see rheumatology. Smear review analysis did not show concern for malignant hematologic process. Marita Bharathjeff was due to follow up 3 month after her initial consultation, but rescheduled.  Patient r History      Marital status: Single      Spouse name: Not on file      Number of children: Not on file      Years of education: Not on file      Highest education level: Not on file    Occupational History      Not on file    Social Needs      Financial re 90 capsule Rfl: 3   ALPRAZolam 0.25 MG Oral Tab Take 1 tablet (0.25 mg total) by mouth nightly as needed for Sleep. Disp: 90 tablet Rfl: 0   Norethin Ace-Eth Estrad-FE (JUNEL FE 1/20) 1-20 MG-MCG Oral Tab Take 1 tablet by mouth once daily.  Disp: 28 tablet and rhythm. S1S2 normal.  Abdomen: Soft, non tender with good bowel sounds. No hepatosplenomegaly. No palpable mass. Extremities: No edema or calf tenderness. Neurological: Grossly intact. Labs:    Lab Results   Component Value Date/Time    WBC 6. lymphoma present.  Her LDH/uric levels were normal    --She had no lymphadenopathy on exam, normal WBC with differential and platelets, so no indication for bone marrow biopsy at this time or full body CT scan to look for possible lymphoma    --patient had with regular nausea, vomiting symptoms in the setting of weight loss, hemoglobin value is roughly the same ~11 g/dL.  Pt mentions her periods have been slowing down due to weight loss which might be a sign of possible anorexia/bulimia nervosa in light of he

## 2019-07-03 DIAGNOSIS — R79.89 ELEVATED CORTISOL LEVEL: Primary | ICD-10-CM

## 2019-07-03 NOTE — TELEPHONE ENCOUNTER
Trying to sort out what is happening with the referral for  US of chest. It was taken prior to authorization from insurance. No mammogram order noted which would normally precede an breast US. Chest Xray was done in April which promoted the [address].

## 2019-07-09 NOTE — TELEPHONE ENCOUNTER
Called Patricia twice yesterday and today no and answer   Can you please call her and see how we can resolve the issue   Patient with breast lump on exam

## 2019-07-12 ENCOUNTER — APPOINTMENT (OUTPATIENT)
Dept: LAB | Facility: HOSPITAL | Age: 24
End: 2019-07-12
Attending: INTERNAL MEDICINE
Payer: MEDICAID

## 2019-07-12 DIAGNOSIS — R92.2 DENSE BREAST: ICD-10-CM

## 2019-07-12 DIAGNOSIS — Z30.41 USES ORAL CONTRACEPTION: ICD-10-CM

## 2019-07-12 DIAGNOSIS — F32.A ANXIETY AND DEPRESSION: ICD-10-CM

## 2019-07-12 DIAGNOSIS — N63.0 BREAST LUMP: ICD-10-CM

## 2019-07-12 DIAGNOSIS — R53.82 CHRONIC FATIGUE: ICD-10-CM

## 2019-07-12 DIAGNOSIS — L70.9 ACNE, UNSPECIFIED ACNE TYPE: ICD-10-CM

## 2019-07-12 DIAGNOSIS — Z00.00 ANNUAL PHYSICAL EXAM: ICD-10-CM

## 2019-07-12 DIAGNOSIS — F41.9 ANXIETY AND DEPRESSION: ICD-10-CM

## 2019-07-12 DIAGNOSIS — R53.83 OTHER FATIGUE: ICD-10-CM

## 2019-07-12 DIAGNOSIS — Z76.0 MEDICATION REFILL: ICD-10-CM

## 2019-07-12 DIAGNOSIS — D64.9 ANEMIA, UNSPECIFIED TYPE: ICD-10-CM

## 2019-07-12 DIAGNOSIS — R76.8 POSITIVE ANA (ANTINUCLEAR ANTIBODY): ICD-10-CM

## 2019-07-12 DIAGNOSIS — R63.4 UNINTENTIONAL WEIGHT LOSS: ICD-10-CM

## 2019-07-12 LAB
C3 SERPL-MCNC: 89.4 MG/DL (ref 90–180)
C4 SERPL-MCNC: 14.8 MG/DL (ref 10–40)
CRP SERPL-MCNC: 0.35 MG/DL (ref ?–0.3)
ERYTHROCYTE [SEDIMENTATION RATE] IN BLOOD: 7 MM/HR (ref 0–20)
VIT B12 SERPL-MCNC: 289 PG/ML (ref 193–986)

## 2019-07-12 PROCEDURE — 86140 C-REACTIVE PROTEIN: CPT | Performed by: INTERNAL MEDICINE

## 2019-07-12 PROCEDURE — 82607 VITAMIN B-12: CPT

## 2019-07-12 PROCEDURE — 86160 COMPLEMENT ANTIGEN: CPT | Performed by: INTERNAL MEDICINE

## 2019-07-12 PROCEDURE — 85652 RBC SED RATE AUTOMATED: CPT | Performed by: INTERNAL MEDICINE

## 2019-07-12 PROCEDURE — 36415 COLL VENOUS BLD VENIPUNCTURE: CPT | Performed by: INTERNAL MEDICINE

## 2019-07-12 PROCEDURE — 86225 DNA ANTIBODY NATIVE: CPT

## 2019-07-16 LAB — DSDNA AB TITR SER: <10 {TITER}

## 2019-07-17 ENCOUNTER — APPOINTMENT (OUTPATIENT)
Dept: LAB | Facility: HOSPITAL | Age: 24
End: 2019-07-17
Attending: INTERNAL MEDICINE
Payer: MEDICAID

## 2019-07-17 DIAGNOSIS — R79.89 ELEVATED CORTISOL LEVEL: ICD-10-CM

## 2019-07-17 PROCEDURE — 82533 TOTAL CORTISOL: CPT

## 2019-07-19 ENCOUNTER — OFFICE VISIT (OUTPATIENT)
Dept: RHEUMATOLOGY | Facility: CLINIC | Age: 24
End: 2019-07-19
Payer: MEDICAID

## 2019-07-19 VITALS — WEIGHT: 98 LBS | SYSTOLIC BLOOD PRESSURE: 98 MMHG | BODY MASS INDEX: 17 KG/M2 | DIASTOLIC BLOOD PRESSURE: 56 MMHG

## 2019-07-19 DIAGNOSIS — R19.7 DIARRHEA, UNSPECIFIED TYPE: ICD-10-CM

## 2019-07-19 DIAGNOSIS — Z30.41 USES ORAL CONTRACEPTION: ICD-10-CM

## 2019-07-19 DIAGNOSIS — R53.82 CHRONIC FATIGUE: ICD-10-CM

## 2019-07-19 DIAGNOSIS — R76.8 POSITIVE ANA (ANTINUCLEAR ANTIBODY): Primary | ICD-10-CM

## 2019-07-19 DIAGNOSIS — R11.0 NAUSEA: ICD-10-CM

## 2019-07-19 LAB — CORTISOL, SALIVA: 0.14 UG/DL

## 2019-07-19 PROCEDURE — 99213 OFFICE O/P EST LOW 20 MIN: CPT | Performed by: INTERNAL MEDICINE

## 2019-07-19 RX ORDER — MELATONIN
1000 DAILY
COMMUNITY
End: 2020-10-22

## 2019-07-19 RX ORDER — NORETHINDRONE ACETATE AND ETHINYL ESTRADIOL AND FERROUS FUMARATE 1MG-20(21)
KIT ORAL
Qty: 28 TABLET | Refills: 0 | Status: SHIPPED | OUTPATIENT
Start: 2019-07-19 | End: 2020-06-10

## 2019-07-19 NOTE — PROGRESS NOTES
Lacey Felty is a 26year old female. HPI:   Patient presents with: Follow - Up: discuss blood work results    I had the pleasure of seeing Lacey Felty on 7/19/2019 for follow up +LIZ, nausea and fatigue.      Current Medications:  Zofran    In Blepharitis 2011    NG:  Blehpharitis, OU, 2011   • Eyelashes turned in     NG: Aberant lashes, OU, 9/26/11; Management: Removed in office, OU, 2011   • Infectious mononucleosis    • Keratitis 2011    NG:  Keratitis, OU, 9/26/11   • Migraines     with aura b/l  Knees: FROM, no warmth or effusion present. No pain with ROM.    Ankles: FROM, no pain or swelling or warmth on palpation  Feet: no pain with MTP squeeze, no toe swelling or pain or warmth on palpation with FROM  Spine: no lumbar or sacral pain on palp Anti-Smith Antibody      Negative Negative   Anti-Thapa/RNP Antibody      Negative Negative     Component      Latest Ref Rng & Units 1/2/2019          10:15 AM   Anti-Smith Antibody      Negative Negative   Anti-Thapa/RNP Antibody      Negative Negative of Anxiety/Depression, Acne (on doxycycline), Vit D def presents for f/u for +LIZ 1:640, fatigue and nausea. Continue to have symptoms of weight loss, fatigue, nausea/vomiting and diarrhea.   She has had EGD and CT of the abdomen which were normal.  She

## 2019-07-24 DIAGNOSIS — R63.4 UNINTENDED WEIGHT LOSS: ICD-10-CM

## 2019-07-24 DIAGNOSIS — R79.89 ELEVATED CORTISOL LEVEL: Primary | ICD-10-CM

## 2019-07-26 ENCOUNTER — PATIENT MESSAGE (OUTPATIENT)
Dept: INTERNAL MEDICINE CLINIC | Facility: CLINIC | Age: 24
End: 2019-07-26

## 2019-07-30 ENCOUNTER — TELEPHONE (OUTPATIENT)
Dept: ENDOCRINOLOGY CLINIC | Facility: CLINIC | Age: 24
End: 2019-07-30

## 2019-07-30 NOTE — TELEPHONE ENCOUNTER
From: Paramjit Long  To: Edilma Unger MD  Sent: 7/26/2019 11:00 AM CDT  Subject: Non-Urgent Medical Question    Hi Dr. Logan Joy. I just made an appointment with Dr. Avila Chapman, however, the earliest I could make an appointment was for October.  I just want

## 2019-07-30 NOTE — TELEPHONE ENCOUNTER
Received call from Dr. Андрей Daly who is covering for Dr. Eddy Beebe. Per Dr. Андрей Daly, she would like a sooner appt for this patient for possible adrenal problem. Pt has chronic unexplained weight loss and abnormal cortisol level.  They have ordered an abdom

## 2019-08-16 ENCOUNTER — PATIENT MESSAGE (OUTPATIENT)
Dept: INTERNAL MEDICINE CLINIC | Facility: CLINIC | Age: 24
End: 2019-08-16

## 2019-08-16 DIAGNOSIS — L70.9 ACNE, UNSPECIFIED ACNE TYPE: ICD-10-CM

## 2019-08-16 DIAGNOSIS — Z30.41 USES ORAL CONTRACEPTION: ICD-10-CM

## 2019-08-16 DIAGNOSIS — R63.4 UNINTENTIONAL WEIGHT LOSS: ICD-10-CM

## 2019-08-16 DIAGNOSIS — Z00.00 ANNUAL PHYSICAL EXAM: ICD-10-CM

## 2019-08-16 DIAGNOSIS — F41.9 ANXIETY AND DEPRESSION: ICD-10-CM

## 2019-08-16 DIAGNOSIS — Z76.0 MEDICATION REFILL: ICD-10-CM

## 2019-08-16 DIAGNOSIS — R53.83 OTHER FATIGUE: ICD-10-CM

## 2019-08-16 DIAGNOSIS — R92.2 DENSE BREAST: ICD-10-CM

## 2019-08-16 DIAGNOSIS — D64.9 ANEMIA, UNSPECIFIED TYPE: ICD-10-CM

## 2019-08-16 DIAGNOSIS — F32.A ANXIETY AND DEPRESSION: ICD-10-CM

## 2019-08-16 DIAGNOSIS — N63.0 BREAST LUMP: ICD-10-CM

## 2019-08-20 ENCOUNTER — OFFICE VISIT (OUTPATIENT)
Dept: ENDOCRINOLOGY CLINIC | Facility: CLINIC | Age: 24
End: 2019-08-20
Payer: MEDICAID

## 2019-08-20 VITALS
HEIGHT: 64 IN | WEIGHT: 98 LBS | DIASTOLIC BLOOD PRESSURE: 63 MMHG | HEART RATE: 76 BPM | SYSTOLIC BLOOD PRESSURE: 102 MMHG | BODY MASS INDEX: 16.73 KG/M2

## 2019-08-20 DIAGNOSIS — R79.89 HIGH SERUM CORTISOL: Primary | ICD-10-CM

## 2019-08-20 PROCEDURE — 99203 OFFICE O/P NEW LOW 30 MIN: CPT | Performed by: INTERNAL MEDICINE

## 2019-08-20 RX ORDER — IBUPROFEN 600 MG/1
600 TABLET ORAL EVERY 8 HOURS PRN
COMMUNITY

## 2019-08-20 NOTE — H&P
New Patient Evaluation - History and Physical    CONSULT - Reason for Visit: Weight loss, vomiting, High serum cortisol  Requesting Physician: Dr. Uriah Larry:  Patient presents with:  Consult: Unexplained weight loss, abnormal labs (elevat DAILY Disp: 90 capsule Rfl: 3   ALPRAZolam 0.25 MG Oral Tab Take 1 tablet (0.25 mg total) by mouth nightly as needed for Sleep.  Disp: 90 tablet Rfl: 0   ondansetron 4 MG Oral Tablet Dispersible Take 1 tablet (4 mg total) by mouth every 8 (eight) hours as n for:  chest pain, palpitations, orthopnea  GI: Negative for:  abdominal pain, nausea, vomiting, diarrhea, constipation, bleeding  Neurology: Negative for: headache, numbness, weakness  Genito-Urinary: Negative for: dysuria, frequency  Psychiatric: + chroni defect is hypothesised to be above the hypothalamic level leading to hyperactivity of the hypothalamic-pituitary-adrenal (HPA) axis that is reversible after remission of depression.     PLAN:  - serum cortisol 7-8 am  - ACTH  - Salivary cortisol    If labs

## 2019-08-21 ENCOUNTER — PATIENT MESSAGE (OUTPATIENT)
Dept: INTERNAL MEDICINE CLINIC | Facility: CLINIC | Age: 24
End: 2019-08-21

## 2019-08-22 NOTE — TELEPHONE ENCOUNTER
From: Bernarda Landon  Sent: 8/22/2019 1:01 PM CDT  To: [facility] Clinical Staff  Subject: RE: Non-Urgent Medical Question    The name of the medication was Doxycycline.  Thank you.     ----- Message -----  From: Twin Lanza  Sent: 8/22/19, 10:44 AM  To: Cherelle Lady

## 2019-08-24 RX ORDER — ALPRAZOLAM 0.25 MG/1
0.25 TABLET ORAL 3 TIMES DAILY PRN
Qty: 90 TABLET | Refills: 2 | Status: SHIPPED | OUTPATIENT
Start: 2019-08-24 | End: 2019-11-08

## 2019-08-24 RX ORDER — DOXYCYCLINE HYCLATE 100 MG
100 TABLET ORAL 2 TIMES DAILY
Qty: 60 TABLET | Refills: 1 | Status: SHIPPED | OUTPATIENT
Start: 2019-08-24 | End: 2020-10-22

## 2019-08-24 NOTE — TELEPHONE ENCOUNTER
From: Monica Bledsoe  To: Margarita Veliz MD  Sent: 8/16/2019 1:59 PM CDT  Subject: Prescription Question    Hi Dr. Doug Galan. You recently changed the way that my Xanax prescription gets filled and instead of giving me 80 you have it written for 30.  [place]

## 2019-10-03 ENCOUNTER — TELEPHONE (OUTPATIENT)
Dept: HEMATOLOGY/ONCOLOGY | Facility: HOSPITAL | Age: 24
End: 2019-10-03

## 2019-11-04 ENCOUNTER — TELEPHONE (OUTPATIENT)
Dept: HEMATOLOGY/ONCOLOGY | Facility: HOSPITAL | Age: 24
End: 2019-11-04

## 2019-11-07 DIAGNOSIS — N63.0 BREAST LUMP: ICD-10-CM

## 2019-11-07 DIAGNOSIS — R63.4 UNINTENTIONAL WEIGHT LOSS: ICD-10-CM

## 2019-11-07 DIAGNOSIS — Z30.41 USES ORAL CONTRACEPTION: ICD-10-CM

## 2019-11-07 DIAGNOSIS — R92.2 DENSE BREAST: ICD-10-CM

## 2019-11-07 DIAGNOSIS — D64.9 ANEMIA, UNSPECIFIED TYPE: ICD-10-CM

## 2019-11-07 DIAGNOSIS — L70.9 ACNE, UNSPECIFIED ACNE TYPE: ICD-10-CM

## 2019-11-07 DIAGNOSIS — Z76.0 MEDICATION REFILL: ICD-10-CM

## 2019-11-07 DIAGNOSIS — Z00.00 ANNUAL PHYSICAL EXAM: ICD-10-CM

## 2019-11-07 DIAGNOSIS — R53.83 OTHER FATIGUE: ICD-10-CM

## 2019-11-07 DIAGNOSIS — F41.9 ANXIETY AND DEPRESSION: ICD-10-CM

## 2019-11-07 DIAGNOSIS — F32.A ANXIETY AND DEPRESSION: ICD-10-CM

## 2019-11-07 RX ORDER — NORETHINDRONE ACETATE AND ETHINYL ESTRADIOL AND FERROUS FUMARATE 1MG-20(21)
KIT ORAL
Qty: 28 TABLET | Refills: 3 | Status: SHIPPED | OUTPATIENT
Start: 2019-11-07 | End: 2019-11-08

## 2019-11-07 NOTE — TELEPHONE ENCOUNTER
Will wait for doctors approval, per pt she's not sure which OCP she is on.  There was a previous refill sent to the

## 2019-11-07 NOTE — TELEPHONE ENCOUNTER
Pt called the office demanding a refill of all meds with an emphasis that birth control should be sent right away.  When asked to make an moose, the patient refused and states that she just came to see Dr. Mynor Guzman for her annual physical and should have to c

## 2019-11-08 DIAGNOSIS — R53.83 OTHER FATIGUE: ICD-10-CM

## 2019-11-08 DIAGNOSIS — L70.9 ACNE, UNSPECIFIED ACNE TYPE: ICD-10-CM

## 2019-11-08 DIAGNOSIS — Z76.0 MEDICATION REFILL: ICD-10-CM

## 2019-11-08 DIAGNOSIS — R92.2 DENSE BREAST: ICD-10-CM

## 2019-11-08 DIAGNOSIS — Z00.00 ANNUAL PHYSICAL EXAM: ICD-10-CM

## 2019-11-08 DIAGNOSIS — F32.A ANXIETY AND DEPRESSION: ICD-10-CM

## 2019-11-08 DIAGNOSIS — R63.4 UNINTENTIONAL WEIGHT LOSS: ICD-10-CM

## 2019-11-08 DIAGNOSIS — Z30.41 USES ORAL CONTRACEPTION: ICD-10-CM

## 2019-11-08 DIAGNOSIS — D64.9 ANEMIA, UNSPECIFIED TYPE: ICD-10-CM

## 2019-11-08 DIAGNOSIS — N63.0 BREAST LUMP: ICD-10-CM

## 2019-11-08 DIAGNOSIS — F41.9 ANXIETY AND DEPRESSION: ICD-10-CM

## 2019-11-08 RX ORDER — FLUOXETINE HYDROCHLORIDE 20 MG/1
CAPSULE ORAL
Qty: 90 CAPSULE | Refills: 3 | Status: SHIPPED | OUTPATIENT
Start: 2019-11-08 | End: 2021-01-05

## 2019-11-08 RX ORDER — DOXYCYCLINE HYCLATE 100 MG
TABLET ORAL
Qty: 60 TABLET | Refills: 1 | OUTPATIENT
Start: 2019-11-08

## 2019-11-08 RX ORDER — NORETHINDRONE ACETATE AND ETHINYL ESTRADIOL 1MG-20(21)
1 KIT ORAL
Qty: 3 PACKAGE | Refills: 3 | Status: SHIPPED | OUTPATIENT
Start: 2019-11-08 | End: 2021-01-29

## 2019-11-08 RX ORDER — ALPRAZOLAM 0.25 MG/1
0.25 TABLET ORAL 3 TIMES DAILY PRN
Qty: 90 TABLET | Refills: 2 | Status: SHIPPED | OUTPATIENT
Start: 2019-11-08 | End: 2020-04-08

## 2019-11-14 ENCOUNTER — TELEPHONE (OUTPATIENT)
Dept: HEMATOLOGY/ONCOLOGY | Facility: HOSPITAL | Age: 24
End: 2019-11-14

## 2020-04-08 ENCOUNTER — PATIENT MESSAGE (OUTPATIENT)
Dept: INTERNAL MEDICINE CLINIC | Facility: CLINIC | Age: 25
End: 2020-04-08

## 2020-04-08 DIAGNOSIS — R92.2 DENSE BREAST: ICD-10-CM

## 2020-04-08 DIAGNOSIS — F32.A ANXIETY AND DEPRESSION: ICD-10-CM

## 2020-04-08 DIAGNOSIS — Z30.41 USES ORAL CONTRACEPTION: ICD-10-CM

## 2020-04-08 DIAGNOSIS — Z00.00 ANNUAL PHYSICAL EXAM: ICD-10-CM

## 2020-04-08 DIAGNOSIS — Z76.0 MEDICATION REFILL: ICD-10-CM

## 2020-04-08 DIAGNOSIS — F41.9 ANXIETY AND DEPRESSION: ICD-10-CM

## 2020-04-08 DIAGNOSIS — L70.9 ACNE, UNSPECIFIED ACNE TYPE: ICD-10-CM

## 2020-04-08 DIAGNOSIS — R53.83 OTHER FATIGUE: ICD-10-CM

## 2020-04-08 DIAGNOSIS — N63.0 BREAST LUMP: ICD-10-CM

## 2020-04-08 DIAGNOSIS — R63.4 UNINTENTIONAL WEIGHT LOSS: ICD-10-CM

## 2020-04-08 DIAGNOSIS — D64.9 ANEMIA, UNSPECIFIED TYPE: ICD-10-CM

## 2020-04-08 RX ORDER — ALPRAZOLAM 0.25 MG/1
0.25 TABLET ORAL 3 TIMES DAILY PRN
Qty: 90 TABLET | Refills: 0 | Status: SHIPPED | OUTPATIENT
Start: 2020-04-08 | End: 2020-05-27

## 2020-04-08 NOTE — TELEPHONE ENCOUNTER
Brittny Louise 4/8/2020 2:35 PM CDT      ----- Message -----  From: Penelope Harp  Sent: 4/8/2020 2:30 PM CDT  To: [facility] Clinical Staff  Subject: Prescription Question     Hi Dr. Hugo Eastman,  I am almost out of my Xanax and have no refills.  I was hoping y

## 2020-05-27 DIAGNOSIS — R92.2 DENSE BREAST: ICD-10-CM

## 2020-05-27 DIAGNOSIS — Z30.41 USES ORAL CONTRACEPTION: ICD-10-CM

## 2020-05-27 DIAGNOSIS — F32.A ANXIETY AND DEPRESSION: ICD-10-CM

## 2020-05-27 DIAGNOSIS — D64.9 ANEMIA, UNSPECIFIED TYPE: ICD-10-CM

## 2020-05-27 DIAGNOSIS — R53.83 OTHER FATIGUE: ICD-10-CM

## 2020-05-27 DIAGNOSIS — L70.9 ACNE, UNSPECIFIED ACNE TYPE: ICD-10-CM

## 2020-05-27 DIAGNOSIS — R63.4 UNINTENTIONAL WEIGHT LOSS: ICD-10-CM

## 2020-05-27 DIAGNOSIS — Z76.0 MEDICATION REFILL: ICD-10-CM

## 2020-05-27 DIAGNOSIS — N63.0 BREAST LUMP: ICD-10-CM

## 2020-05-27 DIAGNOSIS — Z00.00 ANNUAL PHYSICAL EXAM: ICD-10-CM

## 2020-05-27 DIAGNOSIS — F41.9 ANXIETY AND DEPRESSION: ICD-10-CM

## 2020-05-27 RX ORDER — ALPRAZOLAM 0.25 MG/1
0.25 TABLET ORAL 3 TIMES DAILY PRN
Qty: 30 TABLET | Refills: 0 | Status: SHIPPED | OUTPATIENT
Start: 2020-05-27 | End: 2021-02-10

## 2020-06-10 PROCEDURE — 80053 COMPREHEN METABOLIC PANEL: CPT | Performed by: INTERNAL MEDICINE

## 2020-06-10 PROCEDURE — 84443 ASSAY THYROID STIM HORMONE: CPT | Performed by: INTERNAL MEDICINE

## 2020-06-10 PROCEDURE — 82533 TOTAL CORTISOL: CPT | Performed by: INTERNAL MEDICINE

## 2020-06-10 PROCEDURE — 82607 VITAMIN B-12: CPT | Performed by: INTERNAL MEDICINE

## 2020-06-10 PROCEDURE — 86140 C-REACTIVE PROTEIN: CPT | Performed by: INTERNAL MEDICINE

## 2020-06-10 PROCEDURE — 85652 RBC SED RATE AUTOMATED: CPT | Performed by: INTERNAL MEDICINE

## 2020-06-10 PROCEDURE — 84466 ASSAY OF TRANSFERRIN: CPT | Performed by: INTERNAL MEDICINE

## 2020-06-10 PROCEDURE — 82728 ASSAY OF FERRITIN: CPT | Performed by: INTERNAL MEDICINE

## 2020-06-10 PROCEDURE — 83540 ASSAY OF IRON: CPT | Performed by: INTERNAL MEDICINE

## 2020-06-10 PROCEDURE — 85025 COMPLETE CBC W/AUTO DIFF WBC: CPT | Performed by: INTERNAL MEDICINE

## 2020-06-10 NOTE — PROGRESS NOTES
Tori Rojas is a 22year old female.     Chief complaint:  Follow up on chronic conditions and medications refills     HPI:   22year old female with PMH as listed below here for   Follow up on chronic conditions   Depression  On fluoxetine  Has been t Ferrous Sulfate (SLOW FE OR) Take by mouth. • Vitamin B-12 1000 MCG Oral Tab Take 1,000 mcg by mouth daily. • ondansetron 4 MG Oral Tablet Dispersible Take 1 tablet (4 mg total) by mouth every 8 (eight) hours as needed for Nausea.  (Patient not taki (52.3 kg)   SpO2 99%   BMI 19.81 kg/m²   GENERAL: well developed, well nourished,in no apparent distress  SKIN: no rashes,no suspicious lesions  HEENT: atraumatic, normocephalic,ears and throat are clear  NECK: supple,no adenopathy  LUNGS: clear to auscult migraine  Discussed migraine triggers  Advised to take ibuprofen and Tylenol  Given Imitrex to take as needed for the migraine  Advised to increase fluoxetine to 40 mg and see if migraine frequency and severity gets less  Advised to have an MRI of the brai

## 2020-06-10 NOTE — PATIENT INSTRUCTIONS
Preventing Migraine Headaches: Triggers  The first step in preventing migraines is to learn what triggers them. You may then be able to control your triggers to avoid or reduce the severity of your migraines.    Know your triggers  Be aware that you may h · Change your behavior at times when triggers can't be avoided. For example, make sure to get enough rest and drink plenty of water while you're traveling. Make sure to carry a hat, sunglasses, and your medicines.  Be alert for migraine symptoms, so you can · Keep regular habits. Don’t skip or delay meals. Drink plenty of water. And go to bed and get up at about the same time each day. This includes weekends. · Try alternative treatments.  These are treatments that don't involve the use of medicines or surger

## 2020-06-16 ENCOUNTER — TELEPHONE (OUTPATIENT)
Dept: ENDOCRINOLOGY CLINIC | Facility: CLINIC | Age: 25
End: 2020-06-16

## 2020-06-16 NOTE — TELEPHONE ENCOUNTER
Received labs from Dr. All Howard for high cortisol  Please call patient to schedule first available to discuss further  Thanks

## 2020-06-21 DIAGNOSIS — R79.82 CRP ELEVATED: ICD-10-CM

## 2020-06-21 DIAGNOSIS — R51.9 WORSENING HEADACHES: ICD-10-CM

## 2020-06-21 DIAGNOSIS — IMO0002 CHRONIC MIGRAINE: ICD-10-CM

## 2020-06-21 DIAGNOSIS — G93.9 BRAIN LESION: ICD-10-CM

## 2020-06-21 DIAGNOSIS — F32.A ANXIETY AND DEPRESSION: ICD-10-CM

## 2020-06-21 DIAGNOSIS — F41.9 ANXIETY AND DEPRESSION: ICD-10-CM

## 2020-06-21 DIAGNOSIS — R79.89 HIGH SERUM CORTISOL: ICD-10-CM

## 2020-06-21 DIAGNOSIS — D64.9 ANEMIA, UNSPECIFIED TYPE: ICD-10-CM

## 2020-06-22 ENCOUNTER — OFFICE VISIT (OUTPATIENT)
Dept: ENDOCRINOLOGY CLINIC | Facility: CLINIC | Age: 25
End: 2020-06-22
Payer: MEDICAID

## 2020-06-22 VITALS
SYSTOLIC BLOOD PRESSURE: 92 MMHG | WEIGHT: 112 LBS | BODY MASS INDEX: 19 KG/M2 | HEART RATE: 73 BPM | DIASTOLIC BLOOD PRESSURE: 56 MMHG

## 2020-06-22 DIAGNOSIS — R79.89 HIGH SERUM CORTISOL: Primary | ICD-10-CM

## 2020-06-22 PROCEDURE — 99213 OFFICE O/P EST LOW 20 MIN: CPT | Performed by: INTERNAL MEDICINE

## 2020-06-22 RX ORDER — SUMATRIPTAN 100 MG/1
TABLET, FILM COATED ORAL
Qty: 9 TABLET | Refills: 0 | Status: SHIPPED | OUTPATIENT
Start: 2020-06-22 | End: 2020-08-12

## 2020-06-22 NOTE — PROGRESS NOTES
Return Office Visit     CHIEF COMPLAINT:  Patient presents with: Follow - Up: Pt here today to review cortisol labs.         HISTORY OF PRESENT ILLNESS:  Shaq Panchal is a 22year old female who presents for follow up for evaluation of many months of w needed for Nausea. (Patient not taking: Reported on 6/22/2020 ) 20 tablet 1   • ergocalciferol 50391 units Oral Cap   1         ALLERGY:  No Known Allergies    PAST MEDICAL, SOCIAL AND FAMILY HISTORY:  See past medical history marked as reviewed.   See past slight elevation of salivary cortisol. She has depression  Her prior symptoms do not correspond to cortisol levels. She does not have any symptoms of high cortisol at this time.    I discussed the following with the patient:  The vast majority of patients

## 2020-06-22 NOTE — TELEPHONE ENCOUNTER
Requested Prescriptions     Pending Prescriptions Disp Refills   • SUMATRIPTAN SUCCINATE 100 MG Oral Tab [Pharmacy Med Name: SUMAtriptan Succinate 100 MG Oral Tablet] 9 tablet 0     Sig: TAKE 1 TABLET BY MOUTH EVERY 2 HOURS AS NEEDED FOR  MIGRAINE  USE  AT

## 2020-08-12 DIAGNOSIS — G93.9 BRAIN LESION: ICD-10-CM

## 2020-08-12 DIAGNOSIS — R79.89 HIGH SERUM CORTISOL: ICD-10-CM

## 2020-08-12 DIAGNOSIS — IMO0002 CHRONIC MIGRAINE: ICD-10-CM

## 2020-08-12 DIAGNOSIS — D64.9 ANEMIA, UNSPECIFIED TYPE: ICD-10-CM

## 2020-08-12 DIAGNOSIS — F41.9 ANXIETY AND DEPRESSION: ICD-10-CM

## 2020-08-12 DIAGNOSIS — R51.9 WORSENING HEADACHES: ICD-10-CM

## 2020-08-12 DIAGNOSIS — R79.82 CRP ELEVATED: ICD-10-CM

## 2020-08-12 DIAGNOSIS — F32.A ANXIETY AND DEPRESSION: ICD-10-CM

## 2020-08-12 RX ORDER — SUMATRIPTAN 100 MG/1
TABLET, FILM COATED ORAL
Qty: 9 TABLET | Refills: 0 | Status: SHIPPED | OUTPATIENT
Start: 2020-08-12 | End: 2020-09-01

## 2020-08-18 ENCOUNTER — HOSPITAL ENCOUNTER (OUTPATIENT)
Dept: MRI IMAGING | Age: 25
Discharge: HOME | End: 2020-08-18
Attending: INTERNAL MEDICINE
Payer: COMMERCIAL

## 2020-08-18 DIAGNOSIS — IMO0002 CHRONIC MIGRAINE: ICD-10-CM

## 2020-08-18 DIAGNOSIS — F32.A ANXIETY AND DEPRESSION: ICD-10-CM

## 2020-08-18 DIAGNOSIS — R51.9 WORSENING HEADACHES: ICD-10-CM

## 2020-08-18 DIAGNOSIS — F41.9 ANXIETY AND DEPRESSION: ICD-10-CM

## 2020-08-18 DIAGNOSIS — R79.89 HIGH SERUM CORTISOL: ICD-10-CM

## 2020-08-18 DIAGNOSIS — R79.82 CRP ELEVATED: ICD-10-CM

## 2020-08-18 DIAGNOSIS — G93.9 BRAIN LESION: ICD-10-CM

## 2020-08-18 DIAGNOSIS — D64.9 ANEMIA, UNSPECIFIED TYPE: ICD-10-CM

## 2020-08-18 PROCEDURE — 70553 MRI BRAIN STEM W/O & W/DYE: CPT | Performed by: INTERNAL MEDICINE

## 2020-08-18 PROCEDURE — A9575 INJ GADOTERATE MEGLUMI 0.1ML: HCPCS | Performed by: INTERNAL MEDICINE

## 2020-09-01 DIAGNOSIS — R51.9 WORSENING HEADACHES: ICD-10-CM

## 2020-09-01 DIAGNOSIS — F41.9 ANXIETY AND DEPRESSION: ICD-10-CM

## 2020-09-01 DIAGNOSIS — R79.89 HIGH SERUM CORTISOL: ICD-10-CM

## 2020-09-01 DIAGNOSIS — IMO0002 CHRONIC MIGRAINE: ICD-10-CM

## 2020-09-01 DIAGNOSIS — G93.9 BRAIN LESION: ICD-10-CM

## 2020-09-01 DIAGNOSIS — R79.82 CRP ELEVATED: ICD-10-CM

## 2020-09-01 DIAGNOSIS — F32.A ANXIETY AND DEPRESSION: ICD-10-CM

## 2020-09-01 DIAGNOSIS — D64.9 ANEMIA, UNSPECIFIED TYPE: ICD-10-CM

## 2020-09-01 RX ORDER — SUMATRIPTAN 100 MG/1
100 TABLET, FILM COATED ORAL EVERY 2 HOUR PRN
Qty: 9 TABLET | Refills: 0 | Status: SHIPPED | OUTPATIENT
Start: 2020-09-01 | End: 2020-10-05

## 2020-09-10 ENCOUNTER — PATIENT MESSAGE (OUTPATIENT)
Dept: INTERNAL MEDICINE CLINIC | Facility: CLINIC | Age: 25
End: 2020-09-10

## 2020-09-10 DIAGNOSIS — R51.9 WORSENING HEADACHES: ICD-10-CM

## 2020-09-10 DIAGNOSIS — R79.82 CRP ELEVATED: ICD-10-CM

## 2020-09-10 DIAGNOSIS — G93.9 BRAIN LESION: ICD-10-CM

## 2020-09-10 DIAGNOSIS — F32.A ANXIETY AND DEPRESSION: ICD-10-CM

## 2020-09-10 DIAGNOSIS — F41.9 ANXIETY AND DEPRESSION: ICD-10-CM

## 2020-09-10 DIAGNOSIS — D64.9 ANEMIA, UNSPECIFIED TYPE: ICD-10-CM

## 2020-09-10 DIAGNOSIS — R79.89 HIGH SERUM CORTISOL: ICD-10-CM

## 2020-09-10 DIAGNOSIS — IMO0002 CHRONIC MIGRAINE: ICD-10-CM

## 2020-09-10 RX ORDER — CLONAZEPAM 0.5 MG/1
TABLET ORAL
Qty: 60 TABLET | Refills: 0 | Status: SHIPPED | OUTPATIENT
Start: 2020-09-10 | End: 2020-11-06

## 2020-09-11 RX ORDER — AMOXICILLIN AND CLAVULANATE POTASSIUM 875; 125 MG/1; MG/1
1 TABLET, FILM COATED ORAL 2 TIMES DAILY
Qty: 20 TABLET | Refills: 0 | Status: SHIPPED | OUTPATIENT
Start: 2020-09-11 | End: 2020-09-21

## 2020-09-11 NOTE — TELEPHONE ENCOUNTER
Shayy Cash 9/10/2020 2:00 PM CDT      ----- Message -----  From: Krsyta Mendes  Sent: 9/10/2020 11:08 AM CDT  To: Jackelyn Kovacs Clinical Staff  Subject: Prescription Question     Dr. Zach Sampson-    I have noticed some sinus congestion that can feel like it

## 2020-10-01 DIAGNOSIS — R79.89 HIGH SERUM CORTISOL: ICD-10-CM

## 2020-10-01 DIAGNOSIS — R79.82 CRP ELEVATED: ICD-10-CM

## 2020-10-01 DIAGNOSIS — R51.9 WORSENING HEADACHES: ICD-10-CM

## 2020-10-01 DIAGNOSIS — G93.9 BRAIN LESION: ICD-10-CM

## 2020-10-01 DIAGNOSIS — IMO0002 CHRONIC MIGRAINE: ICD-10-CM

## 2020-10-01 DIAGNOSIS — D64.9 ANEMIA, UNSPECIFIED TYPE: ICD-10-CM

## 2020-10-01 DIAGNOSIS — F41.9 ANXIETY AND DEPRESSION: ICD-10-CM

## 2020-10-01 DIAGNOSIS — F32.A ANXIETY AND DEPRESSION: ICD-10-CM

## 2020-10-05 RX ORDER — SUMATRIPTAN 100 MG/1
TABLET, FILM COATED ORAL
Qty: 9 TABLET | Refills: 0 | Status: SHIPPED | OUTPATIENT
Start: 2020-10-05 | End: 2020-11-03

## 2020-10-13 ENCOUNTER — OFFICE VISIT (OUTPATIENT)
Dept: INTERNAL MEDICINE CLINIC | Facility: CLINIC | Age: 25
End: 2020-10-13
Payer: COMMERCIAL

## 2020-10-13 VITALS
HEIGHT: 64 IN | SYSTOLIC BLOOD PRESSURE: 96 MMHG | HEART RATE: 74 BPM | BODY MASS INDEX: 19.15 KG/M2 | DIASTOLIC BLOOD PRESSURE: 84 MMHG | WEIGHT: 112.19 LBS | OXYGEN SATURATION: 96 %

## 2020-10-13 DIAGNOSIS — R51.9 WORSENING HEADACHES: Primary | ICD-10-CM

## 2020-10-13 DIAGNOSIS — R79.89 HIGH SERUM CORTISOL: ICD-10-CM

## 2020-10-13 DIAGNOSIS — D64.9 ANEMIA, UNSPECIFIED TYPE: ICD-10-CM

## 2020-10-13 DIAGNOSIS — Z11.59 SCREENING FOR VIRAL AND CHLAMYDIAL DISEASES: ICD-10-CM

## 2020-10-13 DIAGNOSIS — F41.9 ANXIETY AND DEPRESSION: ICD-10-CM

## 2020-10-13 DIAGNOSIS — Z11.8 SCREENING FOR VIRAL AND CHLAMYDIAL DISEASES: ICD-10-CM

## 2020-10-13 DIAGNOSIS — F32.A ANXIETY AND DEPRESSION: ICD-10-CM

## 2020-10-13 DIAGNOSIS — E61.1 IRON DEFICIENCY: ICD-10-CM

## 2020-10-13 PROCEDURE — 99214 OFFICE O/P EST MOD 30 MIN: CPT | Performed by: INTERNAL MEDICINE

## 2020-10-13 PROCEDURE — 87491 CHLMYD TRACH DNA AMP PROBE: CPT | Performed by: INTERNAL MEDICINE

## 2020-10-13 PROCEDURE — 87591 N.GONORRHOEAE DNA AMP PROB: CPT | Performed by: INTERNAL MEDICINE

## 2020-10-13 PROCEDURE — 3008F BODY MASS INDEX DOCD: CPT | Performed by: INTERNAL MEDICINE

## 2020-10-13 PROCEDURE — 3074F SYST BP LT 130 MM HG: CPT | Performed by: INTERNAL MEDICINE

## 2020-10-13 PROCEDURE — 3079F DIAST BP 80-89 MM HG: CPT | Performed by: INTERNAL MEDICINE

## 2020-10-13 PROCEDURE — 82728 ASSAY OF FERRITIN: CPT | Performed by: INTERNAL MEDICINE

## 2020-10-13 PROCEDURE — 84466 ASSAY OF TRANSFERRIN: CPT | Performed by: INTERNAL MEDICINE

## 2020-10-13 PROCEDURE — 83540 ASSAY OF IRON: CPT | Performed by: INTERNAL MEDICINE

## 2020-10-13 PROCEDURE — 85025 COMPLETE CBC W/AUTO DIFF WBC: CPT | Performed by: INTERNAL MEDICINE

## 2020-10-13 NOTE — PROGRESS NOTES
Nickolas Child is a 22year old female.     Chief complaint: follow up on chronic conditions     HPI:   22year old female with PMH as listed below here for   Follow up on chronic conditions     Has been having more headache   Frontal headache   No foc Tablet Dispersible Take 1 tablet (4 mg total) by mouth every 8 (eight) hours as needed for Nausea.  (Patient not taking: Reported on 6/22/2020 ) 20 tablet 1   • ergocalciferol 22750 units Oral Cap   1      Past Medical History:   Diagnosis Date   • Anxiety distress  SKIN: no rashes,no suspicious lesions  HEENT: atraumatic, normocephalic,ears and throat are clear  NECK: supple,no adenopathy  LUNGS: clear to auscultation  CARDIO: RRR without murmur  GI: good BS's,no masses, HSM or tenderness  EXTREMITIES: no c for viral and chlamydial diseases  GC chlamydia urine test   - CBC WITH DIFFERENTIAL WITH PLATELET; Future  - FERRITIN; Future  - IRON AND TIBC; Future  - CHLAMYDIA/GONOCOCCUS, FATIMAH;  Future  - CBC WITH DIFFERENTIAL WITH PLATELET  - FERRITIN  - IRON AND TIBC

## 2020-10-22 ENCOUNTER — OFFICE VISIT (OUTPATIENT)
Dept: NEUROLOGY | Facility: CLINIC | Age: 25
End: 2020-10-22
Payer: COMMERCIAL

## 2020-10-22 VITALS
SYSTOLIC BLOOD PRESSURE: 100 MMHG | DIASTOLIC BLOOD PRESSURE: 60 MMHG | HEIGHT: 64 IN | BODY MASS INDEX: 19.12 KG/M2 | WEIGHT: 112 LBS | HEART RATE: 80 BPM

## 2020-10-22 DIAGNOSIS — G44.40 REBOUND HEADACHE: ICD-10-CM

## 2020-10-22 DIAGNOSIS — G43.009 MIGRAINE WITHOUT AURA AND WITHOUT STATUS MIGRAINOSUS, NOT INTRACTABLE: Primary | ICD-10-CM

## 2020-10-22 PROCEDURE — 3074F SYST BP LT 130 MM HG: CPT | Performed by: OTHER

## 2020-10-22 PROCEDURE — 3078F DIAST BP <80 MM HG: CPT | Performed by: OTHER

## 2020-10-22 PROCEDURE — 3008F BODY MASS INDEX DOCD: CPT | Performed by: OTHER

## 2020-10-22 PROCEDURE — 99204 OFFICE O/P NEW MOD 45 MIN: CPT | Performed by: OTHER

## 2020-10-22 RX ORDER — METHYLPREDNISOLONE 4 MG/1
TABLET ORAL
Qty: 1 PACKAGE | Refills: 0 | Status: SHIPPED | OUTPATIENT
Start: 2020-10-22 | End: 2020-12-03

## 2020-10-22 RX ORDER — NAPROXEN 375 MG/1
TABLET ORAL
Qty: 30 TABLET | Refills: 0 | Status: SHIPPED | OUTPATIENT
Start: 2020-10-22 | End: 2020-12-03

## 2020-10-22 RX ORDER — GABAPENTIN 300 MG/1
CAPSULE ORAL
Qty: 60 CAPSULE | Refills: 3 | Status: SHIPPED | OUTPATIENT
Start: 2020-10-22 | End: 2020-12-03

## 2020-10-22 NOTE — PROGRESS NOTES
Neurology Initial Visit     Referred By: Dr. Alexandre ref. provider found    Chief Complaint: Patient presents with:  Headache: Patient presents today c/o headaches that she has had for over a year now.  She states that she gets headaches about 5-6 times per we Keratitis 2011    NG:  Keratitis, OU, 9/26/11   • Migraines     with aura. Medication helps.    • Ovarian cyst 2016    history of bilateral cysts that have ruptured in the past   • Varicella 1998    NG: Varicella disease        Past Surgical History:   Proc Rfl: 3  •  FLUoxetine HCl 20 MG Oral Cap, TAKE 1 CAPSULE BY MOUTH ONCE DAILY, Disp: 90 capsule, Rfl: 3  •  ibuprofen 600 MG Oral Tab, Take 600 mg by mouth every 8 (eight) hours as needed for Pain., Disp: , Rfl:   •  Ferrous Sulfate (SLOW FE OR), Take by mo symmetric  Patellar 2+ bilateral symmetric  Ankle jerk 2+ bilateral symmetric    No clonus  No Babinski sign    Coordination:  Finger to nose intact  Rapid alternating movements intact    Gait:  Normal posture  Normal physiologic      Labs:    Lab Results immediately if symptoms worsen. Patient verbalized understanding of information given. All questions were answered. All side effects of drugs were discussed. Return to clinic in: Return in about 4 weeks (around 11/19/2020).     Joe Alonso MD

## 2020-11-02 DIAGNOSIS — R79.82 CRP ELEVATED: ICD-10-CM

## 2020-11-02 DIAGNOSIS — F41.9 ANXIETY AND DEPRESSION: ICD-10-CM

## 2020-11-02 DIAGNOSIS — D64.9 ANEMIA, UNSPECIFIED TYPE: ICD-10-CM

## 2020-11-02 DIAGNOSIS — R51.9 WORSENING HEADACHES: ICD-10-CM

## 2020-11-02 DIAGNOSIS — G93.9 BRAIN LESION: ICD-10-CM

## 2020-11-02 DIAGNOSIS — IMO0002 CHRONIC MIGRAINE: ICD-10-CM

## 2020-11-02 DIAGNOSIS — F32.A ANXIETY AND DEPRESSION: ICD-10-CM

## 2020-11-02 DIAGNOSIS — R79.89 HIGH SERUM CORTISOL: ICD-10-CM

## 2020-11-03 RX ORDER — SUMATRIPTAN 100 MG/1
TABLET, FILM COATED ORAL
Qty: 9 TABLET | Refills: 0 | Status: SHIPPED | OUTPATIENT
Start: 2020-11-03 | End: 2020-12-01

## 2020-11-05 DIAGNOSIS — R51.9 WORSENING HEADACHES: ICD-10-CM

## 2020-11-05 DIAGNOSIS — F32.A ANXIETY AND DEPRESSION: ICD-10-CM

## 2020-11-05 DIAGNOSIS — R79.89 HIGH SERUM CORTISOL: ICD-10-CM

## 2020-11-05 DIAGNOSIS — R79.82 CRP ELEVATED: ICD-10-CM

## 2020-11-05 DIAGNOSIS — F41.9 ANXIETY AND DEPRESSION: ICD-10-CM

## 2020-11-05 DIAGNOSIS — G93.9 BRAIN LESION: ICD-10-CM

## 2020-11-05 DIAGNOSIS — IMO0002 CHRONIC MIGRAINE: ICD-10-CM

## 2020-11-05 DIAGNOSIS — D64.9 ANEMIA, UNSPECIFIED TYPE: ICD-10-CM

## 2020-11-06 RX ORDER — CLONAZEPAM 0.5 MG/1
TABLET ORAL
Qty: 60 TABLET | Refills: 0 | Status: SHIPPED | OUTPATIENT
Start: 2020-11-06 | End: 2021-01-12

## 2020-11-28 DIAGNOSIS — F41.9 ANXIETY AND DEPRESSION: ICD-10-CM

## 2020-11-28 DIAGNOSIS — R51.9 WORSENING HEADACHES: ICD-10-CM

## 2020-11-28 DIAGNOSIS — F32.A ANXIETY AND DEPRESSION: ICD-10-CM

## 2020-11-28 DIAGNOSIS — D64.9 ANEMIA, UNSPECIFIED TYPE: ICD-10-CM

## 2020-11-28 DIAGNOSIS — R79.89 HIGH SERUM CORTISOL: ICD-10-CM

## 2020-11-28 DIAGNOSIS — G93.9 BRAIN LESION: ICD-10-CM

## 2020-11-28 DIAGNOSIS — IMO0002 CHRONIC MIGRAINE: ICD-10-CM

## 2020-11-28 DIAGNOSIS — R79.82 CRP ELEVATED: ICD-10-CM

## 2020-12-01 RX ORDER — SUMATRIPTAN 100 MG/1
TABLET, FILM COATED ORAL
Qty: 9 TABLET | Refills: 0 | Status: SHIPPED | OUTPATIENT
Start: 2020-12-01 | End: 2020-12-03

## 2020-12-03 ENCOUNTER — OFFICE VISIT (OUTPATIENT)
Dept: NEUROLOGY | Facility: CLINIC | Age: 25
End: 2020-12-03
Payer: COMMERCIAL

## 2020-12-03 VITALS — BODY MASS INDEX: 19.12 KG/M2 | HEIGHT: 64 IN | WEIGHT: 112 LBS

## 2020-12-03 DIAGNOSIS — R79.82 CRP ELEVATED: ICD-10-CM

## 2020-12-03 DIAGNOSIS — F41.9 ANXIETY AND DEPRESSION: ICD-10-CM

## 2020-12-03 DIAGNOSIS — G93.9 BRAIN LESION: ICD-10-CM

## 2020-12-03 DIAGNOSIS — R51.9 WORSENING HEADACHES: ICD-10-CM

## 2020-12-03 DIAGNOSIS — G43.009 MIGRAINE WITHOUT AURA AND WITHOUT STATUS MIGRAINOSUS, NOT INTRACTABLE: Primary | ICD-10-CM

## 2020-12-03 DIAGNOSIS — F32.A ANXIETY AND DEPRESSION: ICD-10-CM

## 2020-12-03 DIAGNOSIS — D64.9 ANEMIA, UNSPECIFIED TYPE: ICD-10-CM

## 2020-12-03 DIAGNOSIS — G44.40 REBOUND HEADACHE: ICD-10-CM

## 2020-12-03 DIAGNOSIS — IMO0002 CHRONIC MIGRAINE: ICD-10-CM

## 2020-12-03 DIAGNOSIS — R79.89 HIGH SERUM CORTISOL: ICD-10-CM

## 2020-12-03 PROCEDURE — 99214 OFFICE O/P EST MOD 30 MIN: CPT | Performed by: OTHER

## 2020-12-03 PROCEDURE — 3008F BODY MASS INDEX DOCD: CPT | Performed by: OTHER

## 2020-12-03 RX ORDER — GABAPENTIN 300 MG/1
CAPSULE ORAL
Qty: 60 CAPSULE | Refills: 3 | Status: SHIPPED | OUTPATIENT
Start: 2020-12-03 | End: 2021-03-01

## 2020-12-03 RX ORDER — SUMATRIPTAN 100 MG/1
100 TABLET, FILM COATED ORAL EVERY 2 HOUR PRN
Qty: 9 TABLET | Refills: 5 | Status: SHIPPED | OUTPATIENT
Start: 2020-12-03 | End: 2021-08-03

## 2020-12-03 NOTE — PATIENT INSTRUCTIONS
If your migraine attacks are becoming increasingly more frequent, if you are suffering attacks of disabling migraine despite optimal use of acute therapies, if you are experiencing some degree of headache more days than not, or if your migraine has evolved it is necessary to begin treatment at a low dose and subsequently increase to a target dose over a period of weeks. This can be an especially tough time for a patient who is experiencing headache on a daily or near-daily basis.  There is no current therapy than 10 days per month. Of all the medications available for acute migraine treatment, the nonsteroidal anti-inflammatory drugs appear to have the lowest potential for promoting medication overuse headache.     • Chronic, indefinite use of a medication for called common migraines   · Migraine without aura, formerly called classic migraines  If you have a migraine with aura, you may see things, such as stars, or zigzag lines, or have a temporary blind spot about 30 minutes before the headache starts.  Even if or enlarge, leading to increased blood flow and a severe headache. There also seems to be a genetic link to migraine headaches. More than half of people with migraines have an affected family member.  Migraine triggers can include the following:  · Alcohol come on, the location of the pain, and any symptoms that accompany or precede the headaches. Sometimes it helps to keep a diary about your headaches before seeing the doctor, so you'll have an accurate recording of how often they happen.  (See Lifestyle sec life, how long the headache lasted, and what you did to make it stop.   Other lifestyle measures that may reduce the number of migraines include:  · Avoiding cigarettes, caffeine, and alcohol   · Exercising regularly   · Getting enough sleep each night   · (Topamax)   Botox. Botox, a medication made from a purified form of botulinum toxin, has been approved to treat migraines. Researchers are not sure why it helps some people.  To treat migraines, Botox is given as a series of injections in the forehead, temp · Foods containing the amino acid tyramine, found in red wine, aged cheese, smoked fish, chicken livers, figs, and some beans   · Nuts   · Peanut butter   · Some fruits, like avocado, banana, and citrus   · Onions   · Dairy products   · Meats containing magnesium reduce the frequency of attacks by 41.6%, compared to 15.8% in those who took placebo. Some studies also suggest that magnesium may help women whose migraines are triggered by their periods.  Side effects from magnesium can include lower blood pre as suggestions for specific breathing techniques, qi gong exercise, and dietary changes.   Massage and Physical Therapy  Reflexology is a technique that places pressure on specific \"reflex points\" on the hands and feet that are believed to correspond to a interfere with your daily life. Stroke is an extremely rare complication from severe migraines. Other studies show that migraine headaches are associated with heart disease. People who have migraines are up to 4 times more likely to suffer from depression.

## 2020-12-03 NOTE — PROGRESS NOTES
Neurology Follow up Visit     Referred By: Dr. Alexandre ref. provider found    Chief Complaint: Patient presents with:  Headache: LOV: 10/22/20. F/U on migraines. Patient completed medrol and naproxen taper.  She feels her migraines are a little better but stil 2011   • Infectious mononucleosis    • Keratitis 2011    NG:  Keratitis, OU, 9/26/11   • Migraines     with aura. Medication helps.    • Ovarian cyst 2016    history of bilateral cysts that have ruptured in the past   • Varicella 1998    NG: Varicella disea MG Oral Tab, Take 600 mg by mouth every 8 (eight) hours as needed for Pain., Disp: , Rfl:   •  Ferrous Sulfate (SLOW FE OR), Take by mouth., Disp: , Rfl:   •  ondansetron 4 MG Oral Tablet Dispersible, Take 1 tablet (4 mg total) by mouth every 8 (eight) kenyatta 112 06/10/2020    CO2 24.0 06/10/2020      I have reviewed labs. Imaging Studies:  I have independently reviewed imaging. MRI of the brain showed some minimal nonspecific white matter changes including right temporal lobe lesion        Assessment   1.

## 2021-01-05 ENCOUNTER — OFFICE VISIT (OUTPATIENT)
Dept: NEUROLOGY | Facility: CLINIC | Age: 26
End: 2021-01-05
Payer: COMMERCIAL

## 2021-01-05 VITALS
BODY MASS INDEX: 19.12 KG/M2 | DIASTOLIC BLOOD PRESSURE: 60 MMHG | HEART RATE: 80 BPM | HEIGHT: 64 IN | SYSTOLIC BLOOD PRESSURE: 102 MMHG | WEIGHT: 112 LBS

## 2021-01-05 DIAGNOSIS — G43.009 MIGRAINE WITHOUT AURA AND WITHOUT STATUS MIGRAINOSUS, NOT INTRACTABLE: Primary | ICD-10-CM

## 2021-01-05 DIAGNOSIS — G44.40 REBOUND HEADACHE: ICD-10-CM

## 2021-01-05 PROCEDURE — 3008F BODY MASS INDEX DOCD: CPT | Performed by: OTHER

## 2021-01-05 PROCEDURE — 3074F SYST BP LT 130 MM HG: CPT | Performed by: OTHER

## 2021-01-05 PROCEDURE — 99214 OFFICE O/P EST MOD 30 MIN: CPT | Performed by: OTHER

## 2021-01-05 PROCEDURE — 3078F DIAST BP <80 MM HG: CPT | Performed by: OTHER

## 2021-01-05 RX ORDER — AMITRIPTYLINE HYDROCHLORIDE 10 MG/1
TABLET, FILM COATED ORAL
Qty: 90 TABLET | Refills: 3 | Status: SHIPPED | OUTPATIENT
Start: 2021-01-05 | End: 2021-02-10

## 2021-01-05 NOTE — PROGRESS NOTES
Neurology Follow up Visit     Referred By: Dr. Alexandre ref. provider found    Chief Complaint: Patient presents with:  Headache: LOV: 12/2/20. F/U on migraines.  Patient states her migraines were improving initially with gabapentin but then the last couple wee well.    Patient came back for follow-up in January 2021. And was reporting that while gabapentin might have worked in the beginning she started developed headaches again at frequency at about 4 or 5 a week.   Sumatriptan sometimes would work but if she wo daily as needed for anxiety, Disp: 60 tablet, Rfl: 0  •  ALPRAZolam 0.25 MG Oral Tab, Take 1 tablet (0.25 mg total) by mouth 3 (three) times daily as needed for Sleep or Anxiety. , Disp: 30 tablet, Rfl: 0  •  Norethin Ace-Eth Estrad-FE ([place] Rising FE 1/20) 1-20 Results   Component Value Date    HGB 11.0 (L) 10/13/2020    HCT 32.6 (L) 10/13/2020    MCV 91.8 10/13/2020    WBC 5.8 10/13/2020    .0 10/13/2020      Lab Results   Component Value Date    BUN 11 06/10/2020    CA 8.2 (L) 06/10/2020    ALT 27 06/10/

## 2021-01-08 DIAGNOSIS — F41.9 ANXIETY AND DEPRESSION: ICD-10-CM

## 2021-01-08 DIAGNOSIS — R79.89 HIGH SERUM CORTISOL: ICD-10-CM

## 2021-01-08 DIAGNOSIS — R51.9 WORSENING HEADACHES: ICD-10-CM

## 2021-01-08 DIAGNOSIS — R79.82 CRP ELEVATED: ICD-10-CM

## 2021-01-08 DIAGNOSIS — D64.9 ANEMIA, UNSPECIFIED TYPE: ICD-10-CM

## 2021-01-08 DIAGNOSIS — G93.9 BRAIN LESION: ICD-10-CM

## 2021-01-08 DIAGNOSIS — F32.A ANXIETY AND DEPRESSION: ICD-10-CM

## 2021-01-08 DIAGNOSIS — IMO0002 CHRONIC MIGRAINE: ICD-10-CM

## 2021-01-11 DIAGNOSIS — D64.9 ANEMIA, UNSPECIFIED TYPE: ICD-10-CM

## 2021-01-11 DIAGNOSIS — R51.9 WORSENING HEADACHES: ICD-10-CM

## 2021-01-11 DIAGNOSIS — IMO0002 CHRONIC MIGRAINE: ICD-10-CM

## 2021-01-11 DIAGNOSIS — G93.9 BRAIN LESION: ICD-10-CM

## 2021-01-11 DIAGNOSIS — R79.89 HIGH SERUM CORTISOL: ICD-10-CM

## 2021-01-11 DIAGNOSIS — R79.82 CRP ELEVATED: ICD-10-CM

## 2021-01-11 DIAGNOSIS — F32.A ANXIETY AND DEPRESSION: ICD-10-CM

## 2021-01-11 DIAGNOSIS — F41.9 ANXIETY AND DEPRESSION: ICD-10-CM

## 2021-01-12 RX ORDER — CLONAZEPAM 0.5 MG/1
TABLET ORAL
Qty: 60 TABLET | Refills: 0 | Status: SHIPPED | OUTPATIENT
Start: 2021-01-12 | End: 2021-02-27

## 2021-01-12 RX ORDER — CLONAZEPAM 0.5 MG/1
0.5 TABLET ORAL 2 TIMES DAILY PRN
Qty: 60 TABLET | Refills: 0 | OUTPATIENT
Start: 2021-01-12

## 2021-01-29 ENCOUNTER — TELEPHONE (OUTPATIENT)
Dept: INTERNAL MEDICINE CLINIC | Facility: CLINIC | Age: 26
End: 2021-01-29

## 2021-01-29 DIAGNOSIS — D64.9 ANEMIA, UNSPECIFIED TYPE: ICD-10-CM

## 2021-01-29 DIAGNOSIS — N63.0 BREAST LUMP: ICD-10-CM

## 2021-01-29 DIAGNOSIS — R53.83 OTHER FATIGUE: ICD-10-CM

## 2021-01-29 DIAGNOSIS — F41.9 ANXIETY AND DEPRESSION: ICD-10-CM

## 2021-01-29 DIAGNOSIS — Z76.0 MEDICATION REFILL: ICD-10-CM

## 2021-01-29 DIAGNOSIS — F32.A ANXIETY AND DEPRESSION: ICD-10-CM

## 2021-01-29 DIAGNOSIS — Z30.41 USES ORAL CONTRACEPTION: ICD-10-CM

## 2021-01-29 DIAGNOSIS — Z00.00 ANNUAL PHYSICAL EXAM: ICD-10-CM

## 2021-01-29 DIAGNOSIS — R63.4 UNINTENTIONAL WEIGHT LOSS: ICD-10-CM

## 2021-01-29 DIAGNOSIS — R92.2 DENSE BREAST: ICD-10-CM

## 2021-01-29 DIAGNOSIS — L70.9 ACNE, UNSPECIFIED ACNE TYPE: ICD-10-CM

## 2021-01-29 RX ORDER — NORETHINDRONE ACETATE AND ETHINYL ESTRADIOL 1MG-20(21)
1 KIT ORAL DAILY
Qty: 84 TABLET | Refills: 0 | Status: SHIPPED | OUTPATIENT
Start: 2021-01-29 | End: 2021-04-15

## 2021-01-29 RX ORDER — NORETHINDRONE ACETATE AND ETHINYL ESTRADIOL AND FERROUS FUMARATE 1MG-20(21)
KIT ORAL
Qty: 84 TABLET | Refills: 0 | Status: SHIPPED | OUTPATIENT
Start: 2021-01-29 | End: 2021-01-29

## 2021-01-29 NOTE — TELEPHONE ENCOUNTER
Patient is calling for a refill on her birth control medication. Dr. Bernadette Escobar she said was supposed to put in a year's worth at her last physical.    When she went to refill the medication there are no refills.     She said she needs this medication to s

## 2021-02-04 ENCOUNTER — HOSPITAL ENCOUNTER (OUTPATIENT)
Age: 26
Discharge: HOME | End: 2021-02-04
Attending: EMERGENCY MEDICINE
Payer: COMMERCIAL

## 2021-02-04 VITALS
RESPIRATION RATE: 16 BRPM | DIASTOLIC BLOOD PRESSURE: 54 MMHG | TEMPERATURE: 98 F | OXYGEN SATURATION: 97 % | SYSTOLIC BLOOD PRESSURE: 109 MMHG | HEART RATE: 70 BPM

## 2021-02-04 DIAGNOSIS — N30.01 ACUTE CYSTITIS WITH HEMATURIA: Primary | ICD-10-CM

## 2021-02-04 LAB
B-HCG UR QL: NEGATIVE
GLUCOSE UR STRIP-MCNC: NEGATIVE MG/DL
NITRITE UR QL STRIP: NEGATIVE
PH UR STRIP: 8.5 [PH]
PROT UR STRIP-MCNC: 30 MG/DL
SP GR UR STRIP: 1.02
UROBILINOGEN UR STRIP-ACNC: >=8 MG/DL

## 2021-02-04 PROCEDURE — 81025 URINE PREGNANCY TEST: CPT

## 2021-02-04 PROCEDURE — 81002 URINALYSIS NONAUTO W/O SCOPE: CPT

## 2021-02-04 PROCEDURE — 87088 URINE BACTERIA CULTURE: CPT | Performed by: EMERGENCY MEDICINE

## 2021-02-04 PROCEDURE — 99213 OFFICE O/P EST LOW 20 MIN: CPT

## 2021-02-04 PROCEDURE — 87086 URINE CULTURE/COLONY COUNT: CPT | Performed by: EMERGENCY MEDICINE

## 2021-02-04 PROCEDURE — 87186 SC STD MICRODIL/AGAR DIL: CPT | Performed by: EMERGENCY MEDICINE

## 2021-02-04 PROCEDURE — 99214 OFFICE O/P EST MOD 30 MIN: CPT

## 2021-02-04 RX ORDER — CEPHALEXIN 500 MG/1
500 CAPSULE ORAL 3 TIMES DAILY
Qty: 15 CAPSULE | Refills: 0 | Status: SHIPPED | OUTPATIENT
Start: 2021-02-04 | End: 2021-02-09

## 2021-02-04 NOTE — ED PROVIDER NOTES
Patient Seen in: [facility]      History   Patient presents with:  Urinary Symptoms    Stated Complaint: Possible UTI    HPI/Subjective:   HPI    The patient is a 52-year-old female with past history of migraines who presents now with urinary distress  Head: Normocephalic, no swelling or tenderness  Eyes: Nonicteric sclera, no conjunctival injection  ENT: TMs are clear and flat bilaterally.   There is no posterior pharyngeal erythema  Chest: Clear to auscultation, no tenderness  Cardiovascular:

## 2021-02-10 ENCOUNTER — OFFICE VISIT (OUTPATIENT)
Dept: NEUROLOGY | Facility: CLINIC | Age: 26
End: 2021-02-10
Payer: COMMERCIAL

## 2021-02-10 VITALS
WEIGHT: 112 LBS | SYSTOLIC BLOOD PRESSURE: 106 MMHG | BODY MASS INDEX: 19.12 KG/M2 | DIASTOLIC BLOOD PRESSURE: 64 MMHG | HEIGHT: 64 IN | HEART RATE: 64 BPM

## 2021-02-10 DIAGNOSIS — G43.009 MIGRAINE WITHOUT AURA AND WITHOUT STATUS MIGRAINOSUS, NOT INTRACTABLE: Primary | ICD-10-CM

## 2021-02-10 PROCEDURE — 99214 OFFICE O/P EST MOD 30 MIN: CPT | Performed by: OTHER

## 2021-02-10 PROCEDURE — 3008F BODY MASS INDEX DOCD: CPT | Performed by: OTHER

## 2021-02-10 PROCEDURE — 3074F SYST BP LT 130 MM HG: CPT | Performed by: OTHER

## 2021-02-10 PROCEDURE — 3078F DIAST BP <80 MM HG: CPT | Performed by: OTHER

## 2021-02-10 RX ORDER — TOPIRAMATE 25 MG/1
TABLET ORAL
Qty: 90 TABLET | Refills: 5 | Status: SHIPPED | OUTPATIENT
Start: 2021-02-10

## 2021-02-10 NOTE — PROGRESS NOTES
Neurology Follow up Visit     Referred By: Dr. Alexandre ref. provider found    Chief Complaint: Patient presents with:  Migraine: LOV 01/05/2021. Patient is in office with complaints of migraines 3-4 times a week.  Pt is taking Gabapentin 300 mg 1 tab in am & 3 developed headaches again at frequency at about 4 or 5 a week. Sumatriptan sometimes would work but if she would wake up with a headache typically will last the whole day.     We have tried adding amitriptyline, but it made her drowsy and therefore she sto Disp: 60 capsule, Rfl: 3  •  SUMAtriptan Succinate 100 MG Oral Tab, Take 1 tablet (100 mg total) by mouth every 2 (two) hours as needed for Migraine.  REPEAT ONCE AFTER 2 HOURS ONLY 2 IN 24 HOURS MAX, Disp: 9 tablet, Rfl: 5  •  ibuprofen 600 MG Oral Tab, Ta 06/10/2020     06/10/2020    K 3.6 06/10/2020     06/10/2020    CO2 24.0 06/10/2020      I have reviewed labs. Imaging Studies:  I have independently reviewed imaging.   MRI of the brain showed some minimal nonspecific white matter changes in

## 2021-02-25 DIAGNOSIS — IMO0002 CHRONIC MIGRAINE: ICD-10-CM

## 2021-02-25 DIAGNOSIS — R79.89 HIGH SERUM CORTISOL: ICD-10-CM

## 2021-02-25 DIAGNOSIS — F41.9 ANXIETY AND DEPRESSION: ICD-10-CM

## 2021-02-25 DIAGNOSIS — F32.A ANXIETY AND DEPRESSION: ICD-10-CM

## 2021-02-25 DIAGNOSIS — G93.9 BRAIN LESION: ICD-10-CM

## 2021-02-25 DIAGNOSIS — R79.82 CRP ELEVATED: ICD-10-CM

## 2021-02-25 DIAGNOSIS — R51.9 WORSENING HEADACHES: ICD-10-CM

## 2021-02-25 DIAGNOSIS — D64.9 ANEMIA, UNSPECIFIED TYPE: ICD-10-CM

## 2021-02-27 RX ORDER — CLONAZEPAM 0.5 MG/1
TABLET ORAL
Qty: 60 TABLET | Refills: 0 | Status: SHIPPED | OUTPATIENT
Start: 2021-02-27 | End: 2021-05-16

## 2021-03-01 ENCOUNTER — PATIENT MESSAGE (OUTPATIENT)
Dept: INTERNAL MEDICINE CLINIC | Facility: CLINIC | Age: 26
End: 2021-03-01

## 2021-03-01 RX ORDER — GABAPENTIN 300 MG/1
CAPSULE ORAL
Qty: 270 CAPSULE | Refills: 3 | Status: SHIPPED | OUTPATIENT
Start: 2021-03-01 | End: 2022-05-17

## 2021-03-01 NOTE — TELEPHONE ENCOUNTER
Refill request for gabapentin 300 mg, take 1 cap in am and 3 caps in pm, #120, 3 refills    LOV: 2/10/21  NOV: None

## 2021-03-02 RX ORDER — FLUOXETINE 20 MG/1
20 TABLET, FILM COATED ORAL DAILY
Qty: 90 TABLET | Refills: 1 | Status: SHIPPED | OUTPATIENT
Start: 2021-03-02 | End: 2021-05-31

## 2021-03-02 NOTE — TELEPHONE ENCOUNTER
Storm Sen, [place] 3/1/2021 9:43 AM CST      ----- Message -----  From: Krysta Mendes  Sent: 3/1/2021 7:23 AM CST  To: Jackelyn Kovacs Clinical Staff  Subject: Prescription Question     Good morning Dr. Zach Sampson-   I need to refill the Fluoxetine, but I cou

## 2021-03-02 NOTE — TELEPHONE ENCOUNTER
Pharmacy called regarding the Fluoxetine script sent this morning. Patient's insurance won't cover the tablet form, can they substitute for capsule form.

## 2021-03-03 RX ORDER — FLUOXETINE HYDROCHLORIDE 20 MG/1
20 CAPSULE ORAL DAILY
Qty: 90 CAPSULE | Refills: 1 | Status: SHIPPED | OUTPATIENT
Start: 2021-03-03 | End: 2021-09-11

## 2021-03-27 ENCOUNTER — HOSPITAL ENCOUNTER (OUTPATIENT)
Age: 26
Discharge: HOME | End: 2021-03-27
Payer: COMMERCIAL

## 2021-03-27 VITALS
TEMPERATURE: 97 F | RESPIRATION RATE: 20 BRPM | DIASTOLIC BLOOD PRESSURE: 66 MMHG | OXYGEN SATURATION: 100 % | SYSTOLIC BLOOD PRESSURE: 114 MMHG | HEART RATE: 99 BPM

## 2021-03-27 DIAGNOSIS — J98.8 VIRAL RESPIRATORY ILLNESS: Primary | ICD-10-CM

## 2021-03-27 DIAGNOSIS — Z20.822 EXPOSURE TO COVID-19 VIRUS: ICD-10-CM

## 2021-03-27 DIAGNOSIS — B97.89 VIRAL RESPIRATORY ILLNESS: Primary | ICD-10-CM

## 2021-03-27 LAB — SARS-COV-2 RNA RESP QL NAA+PROBE: NOT DETECTED

## 2021-03-27 PROCEDURE — 99213 OFFICE O/P EST LOW 20 MIN: CPT

## 2021-03-27 NOTE — ED PROVIDER NOTES
Patient Seen in: [facility]    History   CC: covid testing  HPI: Nickolas Child 26year old female  who presents requesting Covid testing after 1 week of mild congestion, runny nose, intermittent headaches and mild fatigue.   No sore throat HPI.  Constitutional and vital signs reviewed. All other systems reviewed and negative except as noted above. PSFH elements reviewed from today and agreed except as otherwise stated in HPI.     Medications :   FLUoxetine HCl 20 MG Oral Cap,  Take 1 Lung sounds clear bilaterally and wob unlabored, good aeration with equal, even expansion bilaterally   CV - RRR  Skin - no rashes or petechiae noted, pink warm and dry throughout, mmm, no obvious signs of swelling/trauma/deformity, cap refill <2seconds  N

## 2021-04-03 ENCOUNTER — HOSPITAL ENCOUNTER (OUTPATIENT)
Age: 26
Discharge: HOME | End: 2021-04-03
Attending: PHYSICIAN ASSISTANT
Payer: COMMERCIAL

## 2021-04-03 VITALS
RESPIRATION RATE: 18 BRPM | SYSTOLIC BLOOD PRESSURE: 99 MMHG | DIASTOLIC BLOOD PRESSURE: 54 MMHG | OXYGEN SATURATION: 100 % | HEART RATE: 66 BPM | TEMPERATURE: 97 F

## 2021-04-03 DIAGNOSIS — Z20.822 ENCOUNTER FOR LABORATORY TESTING FOR COVID-19 VIRUS: ICD-10-CM

## 2021-04-03 DIAGNOSIS — R09.81 NASAL CONGESTION: Primary | ICD-10-CM

## 2021-04-03 PROCEDURE — 99212 OFFICE O/P EST SF 10 MIN: CPT

## 2021-04-03 PROCEDURE — 99213 OFFICE O/P EST LOW 20 MIN: CPT

## 2021-04-03 RX ORDER — FLUTICASONE PROPIONATE 50 MCG
2 SPRAY, SUSPENSION (ML) NASAL DAILY
Qty: 16 G | Refills: 0 | Status: SHIPPED | OUTPATIENT
Start: 2021-04-03 | End: 2021-05-03

## 2021-04-03 NOTE — ED INITIAL ASSESSMENT (HPI)
Was seen here 3/27 for sinus pressure and congestion, negative covid then, fatigued, no fever, had another covid exposure yesterday, here for testing

## 2021-04-03 NOTE — ED PROVIDER NOTES
Patient Seen in: [facility]    History   Patient presents with:  Testing    Stated Complaint: covid test    HPI    Jasmyn Sesay is a 26year old female who presents with chief complaint of nasal congestion. Onset 2 weeks ago.   Patient Tab,  Take 1 tablet by mouth daily. SUMAtriptan Succinate 100 MG Oral Tab,  Take 1 tablet (100 mg total) by mouth every 2 (two) hours as needed for Migraine.  REPEAT ONCE AFTER 2 HOURS ONLY 2 IN 24 HOURS MAX   ibuprofen 600 MG Oral Tab,  Take 600 mg by mo limits. ENT: Pharynx noninjected. Tonsils within normal size limits bilaterally. No tonsillar exudates. TMs within normal limits bilaterally. Mucous membranes moist.  Neck: The neck is supple. There is no evidence of JVD. No meningeal signs.   Chest: [address]  [phone]    Schedule an appointment as soon as possible for a visit in 2 days  For follow-up    We recommend that you schedule follow up care with a primary care provider within the next three months to obtain basic health screening including re

## 2021-04-14 ENCOUNTER — PATIENT MESSAGE (OUTPATIENT)
Dept: INTERNAL MEDICINE CLINIC | Facility: CLINIC | Age: 26
End: 2021-04-14

## 2021-04-14 DIAGNOSIS — L70.9 ACNE, UNSPECIFIED ACNE TYPE: ICD-10-CM

## 2021-04-14 DIAGNOSIS — Z00.00 ANNUAL PHYSICAL EXAM: ICD-10-CM

## 2021-04-14 DIAGNOSIS — R53.83 OTHER FATIGUE: ICD-10-CM

## 2021-04-14 DIAGNOSIS — R92.2 DENSE BREAST: ICD-10-CM

## 2021-04-14 DIAGNOSIS — Z76.0 MEDICATION REFILL: ICD-10-CM

## 2021-04-14 DIAGNOSIS — F32.A ANXIETY AND DEPRESSION: ICD-10-CM

## 2021-04-14 DIAGNOSIS — F41.9 ANXIETY AND DEPRESSION: ICD-10-CM

## 2021-04-14 DIAGNOSIS — R63.4 UNINTENTIONAL WEIGHT LOSS: ICD-10-CM

## 2021-04-14 DIAGNOSIS — Z30.41 USES ORAL CONTRACEPTION: ICD-10-CM

## 2021-04-14 DIAGNOSIS — D64.9 ANEMIA, UNSPECIFIED TYPE: ICD-10-CM

## 2021-04-14 DIAGNOSIS — N63.0 BREAST LUMP: ICD-10-CM

## 2021-04-14 NOTE — TELEPHONE ENCOUNTER
From: Linh Lynne  To: Mariola Dodge MD  Sent: 4/14/2021 12:20 PM CDT  Subject: Referral Request    Good afternoon Dr. Pamela Devine-    I am almost out my birth control and do not have any more refills left.  Can you please send refills to the pharmacy

## 2021-04-15 ENCOUNTER — IMMUNIZATION (OUTPATIENT)
Dept: LAB | Age: 26
End: 2021-04-15
Attending: HOSPITALIST
Payer: COMMERCIAL

## 2021-04-15 DIAGNOSIS — Z23 NEED FOR VACCINATION: Primary | ICD-10-CM

## 2021-04-15 PROCEDURE — 0001A SARSCOV2 VAC 30MCG/0.3ML IM: CPT

## 2021-04-15 RX ORDER — NORETHINDRONE ACETATE AND ETHINYL ESTRADIOL 1MG-20(21)
1 KIT ORAL DAILY
Qty: 84 TABLET | Refills: 0 | Status: SHIPPED | OUTPATIENT
Start: 2021-04-15 | End: 2021-07-16

## 2021-05-06 ENCOUNTER — IMMUNIZATION (OUTPATIENT)
Dept: LAB | Age: 26
End: 2021-05-06
Attending: HOSPITALIST
Payer: COMMERCIAL

## 2021-05-06 DIAGNOSIS — Z23 NEED FOR VACCINATION: Primary | ICD-10-CM

## 2021-05-06 PROCEDURE — 0002A SARSCOV2 VAC 30MCG/0.3ML IM: CPT

## 2021-05-14 DIAGNOSIS — F41.9 ANXIETY AND DEPRESSION: ICD-10-CM

## 2021-05-14 DIAGNOSIS — D64.9 ANEMIA, UNSPECIFIED TYPE: ICD-10-CM

## 2021-05-14 DIAGNOSIS — F32.A ANXIETY AND DEPRESSION: ICD-10-CM

## 2021-05-14 DIAGNOSIS — R79.89 HIGH SERUM CORTISOL: ICD-10-CM

## 2021-05-14 DIAGNOSIS — IMO0002 CHRONIC MIGRAINE: ICD-10-CM

## 2021-05-14 DIAGNOSIS — G93.9 BRAIN LESION: ICD-10-CM

## 2021-05-14 DIAGNOSIS — R79.82 CRP ELEVATED: ICD-10-CM

## 2021-05-14 DIAGNOSIS — R51.9 WORSENING HEADACHES: ICD-10-CM

## 2021-05-14 RX ORDER — CLONAZEPAM 0.5 MG/1
0.5 TABLET ORAL 2 TIMES DAILY PRN
Qty: 60 TABLET | Refills: 0 | OUTPATIENT
Start: 2021-05-14

## 2021-05-16 RX ORDER — CLONAZEPAM 0.5 MG/1
0.5 TABLET ORAL 2 TIMES DAILY PRN
Qty: 60 TABLET | Refills: 0 | Status: SHIPPED | OUTPATIENT
Start: 2021-05-16 | End: 2021-07-08

## 2021-07-08 DIAGNOSIS — R79.89 HIGH SERUM CORTISOL: ICD-10-CM

## 2021-07-08 DIAGNOSIS — R79.82 CRP ELEVATED: ICD-10-CM

## 2021-07-08 DIAGNOSIS — F32.A ANXIETY AND DEPRESSION: ICD-10-CM

## 2021-07-08 DIAGNOSIS — F41.9 ANXIETY AND DEPRESSION: ICD-10-CM

## 2021-07-08 DIAGNOSIS — IMO0002 CHRONIC MIGRAINE: ICD-10-CM

## 2021-07-08 DIAGNOSIS — D64.9 ANEMIA, UNSPECIFIED TYPE: ICD-10-CM

## 2021-07-08 DIAGNOSIS — G93.9 BRAIN LESION: ICD-10-CM

## 2021-07-08 DIAGNOSIS — R51.9 WORSENING HEADACHES: ICD-10-CM

## 2021-07-09 RX ORDER — CLONAZEPAM 0.5 MG/1
0.5 TABLET ORAL 2 TIMES DAILY PRN
Qty: 60 TABLET | Refills: 0 | Status: SHIPPED | OUTPATIENT
Start: 2021-07-09 | End: 2021-07-20

## 2021-07-16 DIAGNOSIS — L70.9 ACNE, UNSPECIFIED ACNE TYPE: ICD-10-CM

## 2021-07-16 DIAGNOSIS — R53.83 OTHER FATIGUE: ICD-10-CM

## 2021-07-16 DIAGNOSIS — N63.0 BREAST LUMP: ICD-10-CM

## 2021-07-16 DIAGNOSIS — R63.4 UNINTENTIONAL WEIGHT LOSS: ICD-10-CM

## 2021-07-16 DIAGNOSIS — F32.A ANXIETY AND DEPRESSION: ICD-10-CM

## 2021-07-16 DIAGNOSIS — D64.9 ANEMIA, UNSPECIFIED TYPE: ICD-10-CM

## 2021-07-16 DIAGNOSIS — R92.2 DENSE BREAST: ICD-10-CM

## 2021-07-16 DIAGNOSIS — Z76.0 MEDICATION REFILL: ICD-10-CM

## 2021-07-16 DIAGNOSIS — Z00.00 ANNUAL PHYSICAL EXAM: ICD-10-CM

## 2021-07-16 DIAGNOSIS — Z30.41 USES ORAL CONTRACEPTION: ICD-10-CM

## 2021-07-16 DIAGNOSIS — F41.9 ANXIETY AND DEPRESSION: ICD-10-CM

## 2021-07-16 RX ORDER — NORETHINDRONE ACETATE AND ETHINYL ESTRADIOL AND FERROUS FUMARATE 1MG-20(21)
KIT ORAL
Qty: 84 TABLET | Refills: 0 | Status: SHIPPED | OUTPATIENT
Start: 2021-07-16

## 2021-07-16 NOTE — TELEPHONE ENCOUNTER
Patient called checking on the status of her birth control medication to be filled. I explained Dr. Charles Raymond is out of the office on Thursday's & Friday's and will return on Monday.

## 2021-07-20 ENCOUNTER — OFFICE VISIT (OUTPATIENT)
Dept: INTERNAL MEDICINE CLINIC | Facility: CLINIC | Age: 26
End: 2021-07-20
Payer: COMMERCIAL

## 2021-07-20 VITALS
WEIGHT: 112 LBS | SYSTOLIC BLOOD PRESSURE: 110 MMHG | HEART RATE: 64 BPM | BODY MASS INDEX: 19.12 KG/M2 | DIASTOLIC BLOOD PRESSURE: 74 MMHG | HEIGHT: 64 IN | OXYGEN SATURATION: 98 %

## 2021-07-20 DIAGNOSIS — F32.A ANXIETY AND DEPRESSION: ICD-10-CM

## 2021-07-20 DIAGNOSIS — R79.82 CRP ELEVATED: ICD-10-CM

## 2021-07-20 DIAGNOSIS — R79.89 HIGH SERUM CORTISOL: ICD-10-CM

## 2021-07-20 DIAGNOSIS — F41.9 ANXIETY AND DEPRESSION: ICD-10-CM

## 2021-07-20 DIAGNOSIS — IMO0002 CHRONIC MIGRAINE: ICD-10-CM

## 2021-07-20 DIAGNOSIS — D64.9 ANEMIA, UNSPECIFIED TYPE: ICD-10-CM

## 2021-07-20 DIAGNOSIS — Z00.00 ANNUAL PHYSICAL EXAM: Primary | ICD-10-CM

## 2021-07-20 DIAGNOSIS — R51.9 WORSENING HEADACHES: ICD-10-CM

## 2021-07-20 LAB
ALBUMIN SERPL-MCNC: 4 G/DL (ref 3.4–5)
ALBUMIN/GLOB SERPL: 1.1 {RATIO} (ref 1–2)
ALP LIVER SERPL-CCNC: 50 U/L
ALT SERPL-CCNC: 37 U/L
ANION GAP SERPL CALC-SCNC: 6 MMOL/L (ref 0–18)
AST SERPL-CCNC: 17 U/L (ref 15–37)
BASOPHILS # BLD AUTO: 0.03 X10(3) UL (ref 0–0.2)
BASOPHILS NFR BLD AUTO: 0.3 %
BILIRUB SERPL-MCNC: 0.5 MG/DL (ref 0.1–2)
BUN BLD-MCNC: 10 MG/DL (ref 7–18)
BUN/CREAT SERPL: 14.3 (ref 10–20)
CALCIUM BLD-MCNC: 8.8 MG/DL (ref 8.5–10.1)
CHLORIDE SERPL-SCNC: 108 MMOL/L (ref 98–112)
CHOLEST SMN-MCNC: 181 MG/DL (ref ?–200)
CO2 SERPL-SCNC: 25 MMOL/L (ref 21–32)
CREAT BLD-MCNC: 0.7 MG/DL
DEPRECATED RDW RBC AUTO: 40.3 FL (ref 35.1–46.3)
EOSINOPHIL # BLD AUTO: 0.07 X10(3) UL (ref 0–0.7)
EOSINOPHIL NFR BLD AUTO: 0.8 %
ERYTHROCYTE [DISTWIDTH] IN BLOOD BY AUTOMATED COUNT: 11.9 % (ref 11–15)
GLOBULIN PLAS-MCNC: 3.7 G/DL (ref 2.8–4.4)
GLUCOSE BLD-MCNC: 90 MG/DL (ref 70–99)
HCT VFR BLD AUTO: 34.8 %
HDLC SERPL-MCNC: 38 MG/DL (ref 40–59)
HGB BLD-MCNC: 11.3 G/DL
IMM GRANULOCYTES # BLD AUTO: 0.02 X10(3) UL (ref 0–1)
IMM GRANULOCYTES NFR BLD: 0.2 %
IRON SATURATION: 28 %
IRON SERPL-MCNC: 128 UG/DL
LDLC SERPL CALC-MCNC: 125 MG/DL (ref ?–100)
LYMPHOCYTES # BLD AUTO: 2.03 X10(3) UL (ref 1–4)
LYMPHOCYTES NFR BLD AUTO: 22.2 %
M PROTEIN MFR SERPL ELPH: 7.7 G/DL (ref 6.4–8.2)
MCH RBC QN AUTO: 30.1 PG (ref 26–34)
MCHC RBC AUTO-ENTMCNC: 32.5 G/DL (ref 31–37)
MCV RBC AUTO: 92.8 FL
MONOCYTES # BLD AUTO: 0.43 X10(3) UL (ref 0.1–1)
MONOCYTES NFR BLD AUTO: 4.7 %
NEUTROPHILS # BLD AUTO: 6.56 X10 (3) UL (ref 1.5–7.7)
NEUTROPHILS # BLD AUTO: 6.56 X10(3) UL (ref 1.5–7.7)
NEUTROPHILS NFR BLD AUTO: 71.8 %
NONHDLC SERPL-MCNC: 143 MG/DL (ref ?–130)
OSMOLALITY SERPL CALC.SUM OF ELEC: 287 MOSM/KG (ref 275–295)
PATIENT FASTING Y/N/NP: YES
PATIENT FASTING Y/N/NP: YES
PLATELET # BLD AUTO: 304 10(3)UL (ref 150–450)
POTASSIUM SERPL-SCNC: 3.6 MMOL/L (ref 3.5–5.1)
RBC # BLD AUTO: 3.75 X10(6)UL
SODIUM SERPL-SCNC: 139 MMOL/L (ref 136–145)
TOTAL IRON BINDING CAPACITY: 462 UG/DL (ref 240–450)
TRANSFERRIN SERPL-MCNC: 310 MG/DL (ref 200–360)
TRIGL SERPL-MCNC: 95 MG/DL (ref 30–149)
TSI SER-ACNC: 1.06 MIU/ML (ref 0.36–3.74)
VLDLC SERPL CALC-MCNC: 17 MG/DL (ref 0–30)
WBC # BLD AUTO: 9.1 X10(3) UL (ref 4–11)

## 2021-07-20 PROCEDURE — 3074F SYST BP LT 130 MM HG: CPT | Performed by: INTERNAL MEDICINE

## 2021-07-20 PROCEDURE — 99395 PREV VISIT EST AGE 18-39: CPT | Performed by: INTERNAL MEDICINE

## 2021-07-20 PROCEDURE — 80050 GENERAL HEALTH PANEL: CPT | Performed by: INTERNAL MEDICINE

## 2021-07-20 PROCEDURE — 84466 ASSAY OF TRANSFERRIN: CPT | Performed by: INTERNAL MEDICINE

## 2021-07-20 PROCEDURE — 3078F DIAST BP <80 MM HG: CPT | Performed by: INTERNAL MEDICINE

## 2021-07-20 PROCEDURE — 3008F BODY MASS INDEX DOCD: CPT | Performed by: INTERNAL MEDICINE

## 2021-07-20 PROCEDURE — 80061 LIPID PANEL: CPT | Performed by: INTERNAL MEDICINE

## 2021-07-20 PROCEDURE — 83540 ASSAY OF IRON: CPT | Performed by: INTERNAL MEDICINE

## 2021-07-20 RX ORDER — CLONAZEPAM 0.5 MG/1
0.5 TABLET ORAL 2 TIMES DAILY PRN
Qty: 60 TABLET | Refills: 2 | Status: SHIPPED | OUTPATIENT
Start: 2021-08-08

## 2021-07-20 NOTE — PROGRESS NOTES
Tatiana Kim is a 26year old female.     Chief complaint:   Annual physical exam     HPI:     Tatiana Kim is a 26year old [place] female who presents for annual physical exam  she is taking birth control   Sexually active with one partner Keratitis, OU, 9/26/11   • Migraines     with aura. Medication helps.    • Ovarian cyst 2016    history of bilateral cysts that have ruptured in the past   • Varicella 1998    NG: Varicella disease      Past Surgical History:   Procedure Laterality Date   • REQUEST B; Future  - CBC WITH DIFFERENTIAL WITH PLATELET; Future  - COMP METABOLIC PANEL (14); Future  - LIPID PANEL; Future  - TSH W REFLEX TO FREE T4; Future  - IRON AND TIBC;  Future  - THINPREP PAP WITH HPV REFLEX REQUEST B  - CBC WITH DIFFERENTIAL WITH to the clinic if you are having persistent or worsening symptoms   Naida Saenz MD,   Diplomate of the American Board of Internal Medicine  Diplomate of the American Board of Obesity Medicine

## 2021-07-26 RX ORDER — FLUCONAZOLE 150 MG/1
150 TABLET ORAL
Qty: 2 TABLET | Refills: 0 | Status: SHIPPED | OUTPATIENT
Start: 2021-07-26

## 2021-08-03 DIAGNOSIS — R79.89 HIGH SERUM CORTISOL: ICD-10-CM

## 2021-08-03 DIAGNOSIS — G93.9 BRAIN LESION: ICD-10-CM

## 2021-08-03 DIAGNOSIS — F41.9 ANXIETY AND DEPRESSION: ICD-10-CM

## 2021-08-03 DIAGNOSIS — F32.A ANXIETY AND DEPRESSION: ICD-10-CM

## 2021-08-03 DIAGNOSIS — IMO0002 CHRONIC MIGRAINE: ICD-10-CM

## 2021-08-03 DIAGNOSIS — R51.9 WORSENING HEADACHES: ICD-10-CM

## 2021-08-03 DIAGNOSIS — D64.9 ANEMIA, UNSPECIFIED TYPE: ICD-10-CM

## 2021-08-03 DIAGNOSIS — R79.82 CRP ELEVATED: ICD-10-CM

## 2021-08-04 RX ORDER — SUMATRIPTAN 100 MG/1
100 TABLET, FILM COATED ORAL EVERY 2 HOUR PRN
Qty: 9 TABLET | Refills: 5 | Status: SHIPPED | OUTPATIENT
Start: 2021-08-04

## 2021-08-04 NOTE — TELEPHONE ENCOUNTER
Refill request for sumatriptan 100 mg, take 1 tab at onset, #9, 5 refills    LOV: 2/10/21  NOV: 8/13/21

## 2021-09-07 ENCOUNTER — HOSPITAL ENCOUNTER (OUTPATIENT)
Age: 26
Discharge: HOME | End: 2021-09-07
Payer: COMMERCIAL

## 2021-09-07 VITALS
TEMPERATURE: 98 F | RESPIRATION RATE: 14 BRPM | HEART RATE: 62 BPM | DIASTOLIC BLOOD PRESSURE: 59 MMHG | SYSTOLIC BLOOD PRESSURE: 100 MMHG | OXYGEN SATURATION: 100 %

## 2021-09-07 DIAGNOSIS — N30.90 CYSTITIS: Primary | ICD-10-CM

## 2021-09-07 LAB
B-HCG UR QL: NEGATIVE
COLOR UR: YELLOW
GLUCOSE UR STRIP-MCNC: NEGATIVE MG/DL
KETONES UR STRIP-MCNC: NEGATIVE MG/DL
NITRITE UR QL STRIP: POSITIVE
PH UR STRIP: 6.5 [PH]
PROT UR STRIP-MCNC: 30 MG/DL
SP GR UR STRIP: >=1.03
UROBILINOGEN UR STRIP-ACNC: 2 MG/DL

## 2021-09-07 PROCEDURE — 87086 URINE CULTURE/COLONY COUNT: CPT | Performed by: NURSE PRACTITIONER

## 2021-09-07 PROCEDURE — 99214 OFFICE O/P EST MOD 30 MIN: CPT

## 2021-09-07 PROCEDURE — 81002 URINALYSIS NONAUTO W/O SCOPE: CPT

## 2021-09-07 PROCEDURE — 81025 URINE PREGNANCY TEST: CPT

## 2021-09-07 PROCEDURE — 99213 OFFICE O/P EST LOW 20 MIN: CPT

## 2021-09-07 PROCEDURE — 87186 SC STD MICRODIL/AGAR DIL: CPT | Performed by: NURSE PRACTITIONER

## 2021-09-07 PROCEDURE — 87077 CULTURE AEROBIC IDENTIFY: CPT | Performed by: NURSE PRACTITIONER

## 2021-09-07 RX ORDER — CEPHALEXIN 500 MG/1
500 CAPSULE ORAL 2 TIMES DAILY
Qty: 14 CAPSULE | Refills: 0 | Status: SHIPPED | OUTPATIENT
Start: 2021-09-07 | End: 2021-09-14

## 2021-09-07 NOTE — ED PROVIDER NOTES
Patient presents with:  Urinary Symptoms      HPI:     Akbar Batista is a 26year old female who presents with a chief complaint of dysuria, urgency, and frequency that started a couple days ago. No chet hematuria. No abdominal or pelvic pain.   No Not Asked        Weight Concern: Not Asked         Service: Not Asked        Blood Transfusions: Not Asked        Occupational Exposure: Not Asked        Hobby Hazards: Not Asked        Sleep Concern: Not Asked        Back Care: Not Asked        Bi supple, no adenopathy  LUNGS: clear to auscultation, no RRW  CARDIO: RRR without murmur  EXTREMITIES: no cyanosis or edema.  WHITE without difficulty  BACK: CVA tenderness: Bilaterally, No  GI: soft, non-tender, without masses or organomegaly  NEURO: No defic instructions for your condition today. Follow Up with:  Neela Cavanaugh MD  [address]  [place] [id number] [phone]    Schedule an appointment as soon as possible for a visit in 3 days

## 2021-09-13 RX ORDER — FLUOXETINE HYDROCHLORIDE 20 MG/1
20 CAPSULE ORAL DAILY
Qty: 90 CAPSULE | Refills: 3 | Status: SHIPPED | OUTPATIENT
Start: 2021-09-13 | End: 2022-06-02 | Stop reason: SDUPTHER

## 2021-09-21 ENCOUNTER — HOSPITAL ENCOUNTER (OUTPATIENT)
Age: 26
Discharge: HOME | End: 2021-09-21
Attending: EMERGENCY MEDICINE
Payer: COMMERCIAL

## 2021-09-21 VITALS
WEIGHT: 112 LBS | OXYGEN SATURATION: 100 % | BODY MASS INDEX: 19.12 KG/M2 | HEART RATE: 61 BPM | RESPIRATION RATE: 16 BRPM | SYSTOLIC BLOOD PRESSURE: 97 MMHG | TEMPERATURE: 98 F | DIASTOLIC BLOOD PRESSURE: 49 MMHG | HEIGHT: 64 IN

## 2021-09-21 DIAGNOSIS — B34.9 VIRAL SYNDROME: Primary | ICD-10-CM

## 2021-09-21 LAB — SARS-COV-2 RNA RESP QL NAA+PROBE: NOT DETECTED

## 2021-09-21 PROCEDURE — 99212 OFFICE O/P EST SF 10 MIN: CPT

## 2021-09-21 NOTE — ED PROVIDER NOTES
Patient Seen in: [facility]      History   Patient presents with:  Headache  Abdomen/Flank Pain    Stated Complaint: headache,fatigue,abdominal pain    Subjective:   HPI    Patient is a 50-year-old female with no significant past medical hist Physical Exam    Constitutional: Well-developed well-nourished in no acute distress  Head: Normocephalic, no swelling or tenderness  Eyes: Nonicteric sclera, no conjunctival injection  ENT: TMs are clear and flat bilaterally.   There is no posterior

## 2021-09-21 NOTE — ED INITIAL ASSESSMENT (HPI)
Pt presents to the IC with c/o headache, fatigue, gi upset, cough and nasal congestion. Pt called off work yesterday and needs a covid test to return. Denies fevers.

## 2021-10-25 ENCOUNTER — HOSPITAL ENCOUNTER (OUTPATIENT)
Age: 26
Discharge: HOME | End: 2021-10-25
Attending: EMERGENCY MEDICINE
Payer: COMMERCIAL

## 2021-10-25 VITALS
HEART RATE: 70 BPM | DIASTOLIC BLOOD PRESSURE: 68 MMHG | TEMPERATURE: 98 F | SYSTOLIC BLOOD PRESSURE: 114 MMHG | RESPIRATION RATE: 18 BRPM | OXYGEN SATURATION: 100 %
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
## 2021-10-25 DIAGNOSIS — S39.012A BACK STRAIN, INITIAL ENCOUNTER: Primary | ICD-10-CM

## 2021-10-25 PROCEDURE — 99213 OFFICE O/P EST LOW 20 MIN: CPT

## 2021-10-25 RX ORDER — DIAZEPAM 5 MG/1
5 TABLET ORAL EVERY 6 HOURS PRN
Qty: 20 TABLET | Refills: 0 | Status: SHIPPED | OUTPATIENT
Start: 2021-10-25 | End: 2021-11-01

## 2021-10-25 RX ORDER — IBUPROFEN 600 MG/1
600 TABLET ORAL EVERY 6 HOURS PRN
Qty: 30 TABLET | Refills: 0 | Status: SHIPPED | OUTPATIENT
Start: 2021-10-25 | End: 2021-11-01
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
## 2021-12-31 ENCOUNTER — HOSPITAL ENCOUNTER (OUTPATIENT)
Age: 26
Discharge: HOME | End: 2021-12-31
Attending: EMERGENCY MEDICINE
Payer: COMMERCIAL

## 2021-12-31 VITALS
RESPIRATION RATE: 18 BRPM | SYSTOLIC BLOOD PRESSURE: 94 MMHG | OXYGEN SATURATION: 99 % | WEIGHT: 110 LBS | HEART RATE: 60 BPM | DIASTOLIC BLOOD PRESSURE: 59 MMHG | BODY MASS INDEX: 19 KG/M2 | TEMPERATURE: 98 F

## 2021-12-31 DIAGNOSIS — R30.0 DYSURIA: Primary | ICD-10-CM

## 2021-12-31 LAB
B-HCG UR QL: NEGATIVE
POCT BLOOD URINE: NEGATIVE
POCT GLUCOSE URINE: NEGATIVE MG/DL
POCT KETONE URINE: NEGATIVE MG/DL
POCT LEUKOCYTE ESTERASE URINE: NEGATIVE
POCT NITRITE URINE: NEGATIVE
POCT PH URINE: 6 (ref 5–8)
POCT PROTEIN URINE: 30 MG/DL
POCT SPECIFIC GRAVITY URINE: 1.03
POCT URINE COLOR: YELLOW
POCT UROBILINOGEN URINE: 1 MG/DL

## 2021-12-31 PROCEDURE — 81002 URINALYSIS NONAUTO W/O SCOPE: CPT | Performed by: EMERGENCY MEDICINE

## 2021-12-31 PROCEDURE — 87186 SC STD MICRODIL/AGAR DIL: CPT | Performed by: EMERGENCY MEDICINE

## 2021-12-31 PROCEDURE — 87086 URINE CULTURE/COLONY COUNT: CPT | Performed by: EMERGENCY MEDICINE

## 2021-12-31 PROCEDURE — 81025 URINE PREGNANCY TEST: CPT | Performed by: EMERGENCY MEDICINE

## 2021-12-31 PROCEDURE — 99202 OFFICE O/P NEW SF 15 MIN: CPT | Performed by: EMERGENCY MEDICINE

## 2021-12-31 PROCEDURE — 87077 CULTURE AEROBIC IDENTIFY: CPT | Performed by: EMERGENCY MEDICINE

## 2021-12-31 NOTE — ED PROVIDER NOTES
Patient [facility]en [facility]: [facility]      History   No chief complaint on file. Stated Complaint: urinary complaint x1 week    Subjective:   HPI    67-year-old woman who comes in with 4 days of dysuria.   She wants to make sure she does not Impression:  Dysuria  (primary encounter diagnosis)     Disposition:  Discharge  12/31/2021  3:00 pm    Follow-up:  Marina Jackson MD  [address]  [place] [address] [phone]    Schedule an appointment as soon as possible for a visit in 3 days

## 2022-01-02 ENCOUNTER — IMMUNIZATION (OUTPATIENT)
Dept: LAB | Facility: HOSPITAL | Age: 27
End: 2022-01-02
Attending: EMERGENCY MEDICINE
Payer: COMMERCIAL

## 2022-01-02 DIAGNOSIS — Z23 NEED FOR VACCINATION: Primary | ICD-10-CM

## 2022-01-02 PROCEDURE — 0004A SARSCOV2 VAC 30MCG/0.3ML IM: CPT

## 2022-01-03 RX ORDER — NITROFURANTOIN 25; 75 MG/1; MG/1
100 CAPSULE ORAL 2 TIMES DAILY
Qty: 10 CAPSULE | Refills: 0 | Status: SHIPPED | OUTPATIENT
Start: 2022-01-03 | End: 2022-01-08

## 2022-01-21 ENCOUNTER — HOSPITAL ENCOUNTER (OUTPATIENT)
Age: 27
Discharge: HOME | End: 2022-01-21
Payer: COMMERCIAL

## 2022-01-21 VITALS
DIASTOLIC BLOOD PRESSURE: 57 MMHG | TEMPERATURE: 98 F | HEIGHT: 64 IN | BODY MASS INDEX: 18.78 KG/M2 | HEART RATE: 70 BPM | WEIGHT: 110 LBS | OXYGEN SATURATION: 98 % | SYSTOLIC BLOOD PRESSURE: 91 MMHG | RESPIRATION RATE: 16 BRPM

## 2022-01-21 DIAGNOSIS — B34.9 VIRAL SYNDROME: Primary | ICD-10-CM

## 2022-01-21 DIAGNOSIS — Z11.52 ENCOUNTER FOR SCREENING FOR COVID-19: ICD-10-CM

## 2022-01-21 LAB — SARS-COV-2 RNA RESP QL NAA+PROBE: NOT DETECTED

## 2022-01-21 PROCEDURE — 99213 OFFICE O/P EST LOW 20 MIN: CPT | Performed by: PHYSICIAN ASSISTANT

## 2022-01-21 PROCEDURE — U0002 COVID-19 LAB TEST NON-CDC: HCPCS | Performed by: PHYSICIAN ASSISTANT

## 2022-01-21 NOTE — ED PROVIDER NOTES
Patient Seen in: [address]      History   Patient presents with:  Covid-19 Test    Stated Complaint: Covid-19 Test    Subjective:   HPI    25yo female presents for a covid test. Sore throat x 2 days. No other symptoms.  Sister tested Nose normal.      Mouth/Throat:      Mouth: Mucous membranes are moist.      Pharynx: No posterior oropharyngeal erythema.       Comments: Clear PND noted  Eyes:      Conjunctiva/sclera: Conjunctivae normal.   Cardiovascular:      Rate and Rhythm: Normal ra

## 2022-01-21 NOTE — ED INITIAL ASSESSMENT (HPI)
Patient here for covid test following exposure to family member, most recently on Wednesday. Patient reports sore throat x 3 days.

## 2022-03-20 DIAGNOSIS — R79.89 HIGH SERUM CORTISOL: ICD-10-CM

## 2022-03-20 DIAGNOSIS — IMO0002 CHRONIC MIGRAINE: ICD-10-CM

## 2022-03-20 DIAGNOSIS — Z00.00 ANNUAL PHYSICAL EXAM: ICD-10-CM

## 2022-03-20 DIAGNOSIS — F41.9 ANXIETY AND DEPRESSION: ICD-10-CM

## 2022-03-20 DIAGNOSIS — R79.82 CRP ELEVATED: ICD-10-CM

## 2022-03-20 DIAGNOSIS — D64.9 ANEMIA, UNSPECIFIED TYPE: ICD-10-CM

## 2022-03-20 DIAGNOSIS — R51.9 WORSENING HEADACHES: ICD-10-CM

## 2022-03-20 DIAGNOSIS — F32.A ANXIETY AND DEPRESSION: ICD-10-CM

## 2022-03-20 RX ORDER — GABAPENTIN 300 MG/1
CAPSULE ORAL
Qty: 270 CAPSULE | Refills: 3 | Status: CANCELLED | OUTPATIENT
Start: 2022-03-20

## 2022-03-21 RX ORDER — CLONAZEPAM 0.5 MG/1
0.5 TABLET ORAL 2 TIMES DAILY PRN
Qty: 60 TABLET | Refills: 2 | OUTPATIENT
Start: 2022-03-21

## 2022-04-07 ENCOUNTER — TELEPHONE (OUTPATIENT)
Dept: INTERNAL MEDICINE CLINIC | Facility: CLINIC | Age: 27
End: 2022-04-07

## 2022-04-07 RX ORDER — CLONAZEPAM 0.5 MG/1
0.5 TABLET ORAL 2 TIMES DAILY PRN
Qty: 30 TABLET | Refills: 0 | Status: SHIPPED | OUTPATIENT
Start: 2022-04-07

## 2022-04-07 NOTE — TELEPHONE ENCOUNTER
Refilled 30 tabs to get her to the appt   Please have patient schedule an appt controlled substance wont be able to refill in the future without an appt   [facility]

## 2022-04-07 NOTE — TELEPHONE ENCOUNTER
Pt called regarding her anxiety meds. She sent the request 2 weeks ago and does not have any meds left. Advised pt that she needs an appt and offered to make one w/other any of our providers, but she is unable to schedule at this time. Advised her to call back when she is able to.

## 2022-04-08 ENCOUNTER — HOSPITAL ENCOUNTER (EMERGENCY)
Facility: HOSPITAL | Age: 27
Discharge: HOME | End: 2022-04-08
Attending: EMERGENCY MEDICINE
Payer: COMMERCIAL

## 2022-04-08 ENCOUNTER — APPOINTMENT (OUTPATIENT)
Dept: CT IMAGING | Facility: HOSPITAL | Age: 27
End: 2022-04-08
Attending: EMERGENCY MEDICINE
Payer: COMMERCIAL

## 2022-04-08 VITALS
DIASTOLIC BLOOD PRESSURE: 64 MMHG | OXYGEN SATURATION: 98 % | TEMPERATURE: 98 F | WEIGHT: 110 LBS | BODY MASS INDEX: 18.78 KG/M2 | RESPIRATION RATE: 16 BRPM | HEIGHT: 64 IN | SYSTOLIC BLOOD PRESSURE: 98 MMHG | HEART RATE: 72 BPM

## 2022-04-08 DIAGNOSIS — R55 SYNCOPE AND COLLAPSE: Primary | ICD-10-CM

## 2022-04-08 LAB
ALBUMIN SERPL-MCNC: 3.7 G/DL (ref 3.4–5)
ALBUMIN/GLOB SERPL: 1.2 {RATIO} (ref 1–2)
ALP LIVER SERPL-CCNC: 45 U/L
ALT SERPL-CCNC: 18 U/L
ANION GAP SERPL CALC-SCNC: 4 MMOL/L (ref 0–18)
AST SERPL-CCNC: 17 U/L (ref 15–37)
ATRIAL RATE: 64 BPM
BASOPHILS # BLD AUTO: 0.05 X10(3) UL (ref 0–0.2)
BASOPHILS NFR BLD AUTO: 0.7 %
BILIRUB SERPL-MCNC: 0.4 MG/DL (ref 0.1–2)
BUN BLD-MCNC: 7 MG/DL (ref 7–18)
CALCIUM BLD-MCNC: 8.2 MG/DL (ref 8.5–10.1)
CHLORIDE SERPL-SCNC: 109 MMOL/L (ref 98–112)
CO2 SERPL-SCNC: 26 MMOL/L (ref 21–32)
CREAT BLD-MCNC: 0.72 MG/DL
EOSINOPHIL # BLD AUTO: 0.08 X10(3) UL (ref 0–0.7)
EOSINOPHIL NFR BLD AUTO: 1.1 %
ERYTHROCYTE [DISTWIDTH] IN BLOOD BY AUTOMATED COUNT: 12.4 %
GLOBULIN PLAS-MCNC: 3.1 G/DL (ref 2.8–4.4)
GLUCOSE BLD-MCNC: 92 MG/DL (ref 70–99)
GLUCOSE BLD-MCNC: 96 MG/DL (ref 70–99)
HCT VFR BLD AUTO: 33 %
HGB BLD-MCNC: 11.5 G/DL
IMM GRANULOCYTES # BLD AUTO: 0.04 X10(3) UL (ref 0–1)
IMM GRANULOCYTES NFR BLD: 0.5 %
LYMPHOCYTES # BLD AUTO: 3.46 X10(3) UL (ref 1–4)
LYMPHOCYTES NFR BLD AUTO: 45.5 %
MCH RBC QN AUTO: 30.9 PG (ref 26–34)
MCHC RBC AUTO-ENTMCNC: 34.8 G/DL (ref 31–37)
MCV RBC AUTO: 88.7 FL
MONOCYTES # BLD AUTO: 0.58 X10(3) UL (ref 0.1–1)
MONOCYTES NFR BLD AUTO: 7.6 %
NEUTROPHILS # BLD AUTO: 3.39 X10 (3) UL (ref 1.5–7.7)
NEUTROPHILS # BLD AUTO: 3.39 X10(3) UL (ref 1.5–7.7)
NEUTROPHILS NFR BLD AUTO: 44.6 %
OSMOLALITY SERPL CALC.SUM OF ELEC: 286 MOSM/KG (ref 275–295)
P AXIS: 62 DEGREES
P-R INTERVAL: 160 MS
PLATELET # BLD AUTO: 269 10(3)UL (ref 150–450)
POTASSIUM SERPL-SCNC: 3.4 MMOL/L (ref 3.5–5.1)
PROT SERPL-MCNC: 6.8 G/DL (ref 6.4–8.2)
Q-T INTERVAL: 428 MS
QRS DURATION: 96 MS
QTC CALCULATION (BEZET): 441 MS
R AXIS: 83 DEGREES
RBC # BLD AUTO: 3.72 X10(6)UL
SODIUM SERPL-SCNC: 139 MMOL/L (ref 136–145)
T AXIS: 56 DEGREES
VENTRICULAR RATE: 64 BPM
WBC # BLD AUTO: 7.6 X10(3) UL (ref 4–11)

## 2022-04-08 PROCEDURE — 85025 COMPLETE CBC W/AUTO DIFF WBC: CPT | Performed by: EMERGENCY MEDICINE

## 2022-04-08 PROCEDURE — 96361 HYDRATE IV INFUSION ADD-ON: CPT

## 2022-04-08 PROCEDURE — 70450 CT HEAD/BRAIN W/O DYE: CPT | Performed by: EMERGENCY MEDICINE

## 2022-04-08 PROCEDURE — 93010 ELECTROCARDIOGRAM REPORT: CPT

## 2022-04-08 PROCEDURE — 82962 GLUCOSE BLOOD TEST: CPT

## 2022-04-08 PROCEDURE — 96360 HYDRATION IV INFUSION INIT: CPT

## 2022-04-08 PROCEDURE — 80053 COMPREHEN METABOLIC PANEL: CPT | Performed by: EMERGENCY MEDICINE

## 2022-04-08 PROCEDURE — 99285 EMERGENCY DEPT VISIT HI MDM: CPT

## 2022-04-08 PROCEDURE — 93005 ELECTROCARDIOGRAM TRACING: CPT

## 2022-04-08 NOTE — TELEPHONE ENCOUNTER
Left voice mail to call back to schedule a visit     Dr only sent 30 day supply can not refill further until she is seen

## 2022-04-08 NOTE — ED INITIAL ASSESSMENT (HPI)
Arrived via EMS for reports of syncopal episode, \"standing in the kitchen talking to Mom, got pale and passed out for about 30 secs. \" Pt arrived with C collar in place. She denies pain, denies BLACK. Per EMS, accucheck 69 on arrival to scene, given oral glucose with a repeat accucheck of 111.  Pt awake, alert, oriented x4

## 2022-05-17 ENCOUNTER — TELEPHONE (OUTPATIENT)
Dept: NEUROLOGY | Facility: CLINIC | Age: 27
End: 2022-05-17

## 2022-05-17 ENCOUNTER — OFFICE VISIT (OUTPATIENT)
Dept: NEUROLOGY | Facility: CLINIC | Age: 27
End: 2022-05-17
Payer: COMMERCIAL

## 2022-05-17 VITALS — HEIGHT: 64 IN | WEIGHT: 105 LBS | BODY MASS INDEX: 17.93 KG/M2

## 2022-05-17 DIAGNOSIS — G93.9 BRAIN LESION: ICD-10-CM

## 2022-05-17 DIAGNOSIS — G43.709 CHRONIC MIGRAINE W/O AURA W/O STATUS MIGRAINOSUS, NOT INTRACTABLE: ICD-10-CM

## 2022-05-17 DIAGNOSIS — G43.109 MIGRAINE WITH AURA AND WITHOUT STATUS MIGRAINOSUS, NOT INTRACTABLE: ICD-10-CM

## 2022-05-17 DIAGNOSIS — F32.A ANXIETY AND DEPRESSION: ICD-10-CM

## 2022-05-17 DIAGNOSIS — D64.9 ANEMIA, UNSPECIFIED TYPE: ICD-10-CM

## 2022-05-17 DIAGNOSIS — R79.89 HIGH SERUM CORTISOL: ICD-10-CM

## 2022-05-17 DIAGNOSIS — R79.82 CRP ELEVATED: ICD-10-CM

## 2022-05-17 DIAGNOSIS — G43.009 MIGRAINE WITHOUT AURA AND WITHOUT STATUS MIGRAINOSUS, NOT INTRACTABLE: ICD-10-CM

## 2022-05-17 DIAGNOSIS — R51.9 WORSENING HEADACHES: Primary | ICD-10-CM

## 2022-05-17 DIAGNOSIS — F41.9 ANXIETY AND DEPRESSION: ICD-10-CM

## 2022-05-17 PROCEDURE — 99214 OFFICE O/P EST MOD 30 MIN: CPT | Performed by: OTHER

## 2022-05-17 PROCEDURE — 3008F BODY MASS INDEX DOCD: CPT | Performed by: OTHER

## 2022-05-17 RX ORDER — GABAPENTIN 300 MG/1
CAPSULE ORAL
Qty: 270 CAPSULE | Refills: 3 | Status: SHIPPED | OUTPATIENT
Start: 2022-05-17

## 2022-05-17 RX ORDER — SUMATRIPTAN 100 MG/1
100 TABLET, FILM COATED ORAL EVERY 2 HOUR PRN
Qty: 9 TABLET | Refills: 5 | Status: SHIPPED | OUTPATIENT
Start: 2022-05-17

## 2022-05-17 NOTE — TELEPHONE ENCOUNTER
Called [facility] BS Precertification  for authorization of approval of Botox 200 units cpt codes [id number], [id number]. Dx: E29.988. Mellisa COTA initiated request. PA # [id number]. Faxed clinical notes pending approval.

## 2022-05-25 ENCOUNTER — HOSPITAL ENCOUNTER (EMERGENCY)
Facility: HOSPITAL | Age: 27
Discharge: HOME | End: 2022-05-25
Attending: EMERGENCY MEDICINE
Payer: COMMERCIAL

## 2022-05-25 ENCOUNTER — APPOINTMENT (OUTPATIENT)
Dept: CT IMAGING | Facility: HOSPITAL | Age: 27
End: 2022-05-25
Attending: EMERGENCY MEDICINE
Payer: COMMERCIAL

## 2022-05-25 VITALS
BODY MASS INDEX: 17.93 KG/M2 | RESPIRATION RATE: 20 BRPM | WEIGHT: 105 LBS | DIASTOLIC BLOOD PRESSURE: 75 MMHG | OXYGEN SATURATION: 97 % | TEMPERATURE: 97 F | HEIGHT: 64 IN | HEART RATE: 59 BPM | SYSTOLIC BLOOD PRESSURE: 110 MMHG

## 2022-05-25 DIAGNOSIS — N30.01 ACUTE CYSTITIS WITH HEMATURIA: Primary | ICD-10-CM

## 2022-05-25 LAB
ALBUMIN SERPL-MCNC: 4.2 G/DL (ref 3.4–5)
ALBUMIN/GLOB SERPL: 1.3 {RATIO} (ref 1–2)
ALP LIVER SERPL-CCNC: 48 U/L
ALT SERPL-CCNC: 16 U/L
ANION GAP SERPL CALC-SCNC: 6 MMOL/L (ref 0–18)
AST SERPL-CCNC: 14 U/L (ref 15–37)
B-HCG UR QL: NEGATIVE
BASOPHILS # BLD AUTO: 0.03 X10(3) UL (ref 0–0.2)
BASOPHILS NFR BLD AUTO: 0.5 %
BILIRUB SERPL-MCNC: 0.4 MG/DL (ref 0.1–2)
BILIRUB UR QL STRIP.AUTO: NEGATIVE
BUN BLD-MCNC: 8 MG/DL (ref 7–18)
CALCIUM BLD-MCNC: 9 MG/DL (ref 8.5–10.1)
CHLORIDE SERPL-SCNC: 108 MMOL/L (ref 98–112)
CO2 SERPL-SCNC: 25 MMOL/L (ref 21–32)
CREAT BLD-MCNC: 0.92 MG/DL
EOSINOPHIL # BLD AUTO: 0.06 X10(3) UL (ref 0–0.7)
EOSINOPHIL NFR BLD AUTO: 1 %
ERYTHROCYTE [DISTWIDTH] IN BLOOD BY AUTOMATED COUNT: 11.9 %
GLOBULIN PLAS-MCNC: 3.2 G/DL (ref 2.8–4.4)
GLUCOSE BLD-MCNC: 107 MG/DL (ref 70–99)
GLUCOSE UR STRIP.AUTO-MCNC: NEGATIVE MG/DL
HCT VFR BLD AUTO: 35 %
HGB BLD-MCNC: 12.3 G/DL
IMM GRANULOCYTES # BLD AUTO: 0.01 X10(3) UL (ref 0–1)
IMM GRANULOCYTES NFR BLD: 0.2 %
KETONES UR STRIP.AUTO-MCNC: NEGATIVE MG/DL
LIPASE SERPL-CCNC: 102 U/L (ref 73–393)
LYMPHOCYTES # BLD AUTO: 1.94 X10(3) UL (ref 1–4)
LYMPHOCYTES NFR BLD AUTO: 31.2 %
MCH RBC QN AUTO: 31.1 PG (ref 26–34)
MCHC RBC AUTO-ENTMCNC: 35.1 G/DL (ref 31–37)
MCV RBC AUTO: 88.4 FL
MONOCYTES # BLD AUTO: 0.43 X10(3) UL (ref 0.1–1)
MONOCYTES NFR BLD AUTO: 6.9 %
NEUTROPHILS # BLD AUTO: 3.74 X10 (3) UL (ref 1.5–7.7)
NEUTROPHILS # BLD AUTO: 3.74 X10(3) UL (ref 1.5–7.7)
NEUTROPHILS NFR BLD AUTO: 60.2 %
NITRITE UR QL STRIP.AUTO: NEGATIVE
OSMOLALITY SERPL CALC.SUM OF ELEC: 287 MOSM/KG (ref 275–295)
PH UR STRIP.AUTO: 7 [PH] (ref 5–8)
PLATELET # BLD AUTO: 272 10(3)UL (ref 150–450)
POTASSIUM SERPL-SCNC: 3.6 MMOL/L (ref 3.5–5.1)
PROT SERPL-MCNC: 7.4 G/DL (ref 6.4–8.2)
PROT UR STRIP.AUTO-MCNC: 100 MG/DL
RBC # BLD AUTO: 3.96 X10(6)UL
RBC #/AREA URNS AUTO: >10 /HPF
SODIUM SERPL-SCNC: 139 MMOL/L (ref 136–145)
SP GR UR STRIP.AUTO: 1.02 (ref 1–1.03)
UROBILINOGEN UR STRIP.AUTO-MCNC: 4 MG/DL
WBC # BLD AUTO: 6.2 X10(3) UL (ref 4–11)

## 2022-05-25 PROCEDURE — 96361 HYDRATE IV INFUSION ADD-ON: CPT

## 2022-05-25 PROCEDURE — 96365 THER/PROPH/DIAG IV INF INIT: CPT

## 2022-05-25 PROCEDURE — 96376 TX/PRO/DX INJ SAME DRUG ADON: CPT

## 2022-05-25 PROCEDURE — 74176 CT ABD & PELVIS W/O CONTRAST: CPT | Performed by: EMERGENCY MEDICINE

## 2022-05-25 PROCEDURE — 85025 COMPLETE CBC W/AUTO DIFF WBC: CPT | Performed by: EMERGENCY MEDICINE

## 2022-05-25 PROCEDURE — 99284 EMERGENCY DEPT VISIT MOD MDM: CPT

## 2022-05-25 PROCEDURE — 87086 URINE CULTURE/COLONY COUNT: CPT | Performed by: EMERGENCY MEDICINE

## 2022-05-25 PROCEDURE — 83690 ASSAY OF LIPASE: CPT | Performed by: EMERGENCY MEDICINE

## 2022-05-25 PROCEDURE — 81025 URINE PREGNANCY TEST: CPT

## 2022-05-25 PROCEDURE — 81001 URINALYSIS AUTO W/SCOPE: CPT | Performed by: EMERGENCY MEDICINE

## 2022-05-25 PROCEDURE — 96375 TX/PRO/DX INJ NEW DRUG ADDON: CPT

## 2022-05-25 PROCEDURE — 80053 COMPREHEN METABOLIC PANEL: CPT | Performed by: EMERGENCY MEDICINE

## 2022-05-25 RX ORDER — CEPHALEXIN 500 MG/1
500 CAPSULE ORAL 4 TIMES DAILY
Qty: 28 CAPSULE | Refills: 0 | Status: SHIPPED | OUTPATIENT
Start: 2022-05-25 | End: 2022-06-01

## 2022-05-25 RX ORDER — IBUPROFEN 800 MG/1
800 TABLET ORAL EVERY 8 HOURS PRN
Qty: 30 TABLET | Refills: 0 | Status: SHIPPED | OUTPATIENT
Start: 2022-05-25 | End: 2022-06-01

## 2022-05-25 RX ORDER — HYDROMORPHONE HYDROCHLORIDE 1 MG/ML
0.5 INJECTION, SOLUTION INTRAMUSCULAR; INTRAVENOUS; SUBCUTANEOUS EVERY 30 MIN PRN
Status: COMPLETED | OUTPATIENT
Start: 2022-05-25 | End: 2022-05-25

## 2022-05-25 RX ORDER — KETOROLAC TROMETHAMINE 30 MG/ML
15 INJECTION, SOLUTION INTRAMUSCULAR; INTRAVENOUS ONCE
Status: COMPLETED | OUTPATIENT
Start: 2022-05-25 | End: 2022-05-25

## 2022-05-25 RX ORDER — ONDANSETRON 2 MG/ML
4 INJECTION INTRAMUSCULAR; INTRAVENOUS ONCE
Status: COMPLETED | OUTPATIENT
Start: 2022-05-25 | End: 2022-05-25

## 2022-06-01 ENCOUNTER — OFFICE VISIT (OUTPATIENT)
Dept: INTERNAL MEDICINE CLINIC | Facility: CLINIC | Age: 27
End: 2022-06-01
Payer: COMMERCIAL

## 2022-06-01 VITALS
WEIGHT: 106.38 LBS | SYSTOLIC BLOOD PRESSURE: 90 MMHG | HEIGHT: 64 IN | BODY MASS INDEX: 18.16 KG/M2 | DIASTOLIC BLOOD PRESSURE: 52 MMHG | HEART RATE: 57 BPM | OXYGEN SATURATION: 100 %

## 2022-06-01 DIAGNOSIS — G43.709 CHRONIC MIGRAINE WITHOUT AURA WITHOUT STATUS MIGRAINOSUS, NOT INTRACTABLE: ICD-10-CM

## 2022-06-01 DIAGNOSIS — R06.2 WHEEZING: ICD-10-CM

## 2022-06-01 DIAGNOSIS — Z76.0 MEDICATION REFILL: ICD-10-CM

## 2022-06-01 DIAGNOSIS — F41.9 ANXIETY: ICD-10-CM

## 2022-06-01 DIAGNOSIS — N39.0 FREQUENT UTI: Primary | ICD-10-CM

## 2022-06-01 PROCEDURE — 3078F DIAST BP <80 MM HG: CPT | Performed by: INTERNAL MEDICINE

## 2022-06-01 PROCEDURE — 99214 OFFICE O/P EST MOD 30 MIN: CPT | Performed by: INTERNAL MEDICINE

## 2022-06-01 PROCEDURE — 3074F SYST BP LT 130 MM HG: CPT | Performed by: INTERNAL MEDICINE

## 2022-06-01 PROCEDURE — 3008F BODY MASS INDEX DOCD: CPT | Performed by: INTERNAL MEDICINE

## 2022-06-01 RX ORDER — ALBUTEROL SULFATE 90 UG/1
2 AEROSOL, METERED RESPIRATORY (INHALATION) EVERY 6 HOURS PRN
Qty: 1 EACH | Refills: 2 | Status: SHIPPED | OUTPATIENT
Start: 2022-06-01 | End: 2022-06-02 | Stop reason: CLARIF

## 2022-06-01 RX ORDER — FLUOXETINE HYDROCHLORIDE 20 MG/1
20 CAPSULE ORAL DAILY
Qty: 90 CAPSULE | Refills: 3 | Status: SHIPPED | OUTPATIENT
Start: 2022-06-01 | End: 2022-08-30

## 2022-06-01 RX ORDER — CLONAZEPAM 0.5 MG/1
0.5 TABLET ORAL 2 TIMES DAILY PRN
Qty: 60 TABLET | Refills: 2 | Status: SHIPPED | OUTPATIENT
Start: 2022-06-01

## 2022-06-02 ENCOUNTER — TELEPHONE (OUTPATIENT)
Dept: INTERNAL MEDICINE CLINIC | Facility: CLINIC | Age: 27
End: 2022-06-02

## 2022-06-02 RX ORDER — ALBUTEROL SULFATE 90 UG/1
2 AEROSOL, METERED RESPIRATORY (INHALATION) EVERY 6 HOURS PRN
Qty: 1 EACH | Refills: 1 | Status: SHIPPED | OUTPATIENT
Start: 2022-06-02

## 2022-06-02 NOTE — TELEPHONE ENCOUNTER
Spoke with Antonio Gilliam, Pharmacist, also confirmed. Only ProAir generic is covered. Gave verbal okay.

## 2022-06-02 NOTE — TELEPHONE ENCOUNTER
[address] called as the Albuterol inhaler will not be covered by her insurance for the name brand. Patient is asking if Dr. Steve Kim could please send in a new script for the generic brand.

## 2022-06-02 NOTE — TELEPHONE ENCOUNTER
LVM:  Order for inhaler was resent but still incorrect. ProAir is not a covered medication. Need a generic? Please resend.

## 2022-06-02 NOTE — TELEPHONE ENCOUNTER
Generic was sent but inhalers can be expensive   Izabel can you check with pharmacy if ventolin brand would be cheaper ?   Please update patient

## 2022-07-27 ENCOUNTER — HOSPITAL ENCOUNTER (OUTPATIENT)
Age: 27
Discharge: HOME | End: 2022-07-27
Payer: COMMERCIAL

## 2022-07-27 VITALS
DIASTOLIC BLOOD PRESSURE: 53 MMHG | HEIGHT: 64 IN | RESPIRATION RATE: 18 BRPM | SYSTOLIC BLOOD PRESSURE: 91 MMHG | HEART RATE: 62 BPM | BODY MASS INDEX: 18.78 KG/M2 | WEIGHT: 110 LBS | OXYGEN SATURATION: 99 % | TEMPERATURE: 97 F

## 2022-07-27 DIAGNOSIS — Z20.822 EXPOSURE TO COVID-19 VIRUS: Primary | ICD-10-CM

## 2022-07-27 LAB — SARS-COV-2 RNA RESP QL NAA+PROBE: NOT DETECTED

## 2022-07-27 PROCEDURE — U0002 COVID-19 LAB TEST NON-CDC: HCPCS | Performed by: NURSE PRACTITIONER

## 2022-07-27 PROCEDURE — 99212 OFFICE O/P EST SF 10 MIN: CPT | Performed by: NURSE PRACTITIONER

## 2022-07-27 NOTE — ED INITIAL ASSESSMENT (HPI)
Patient states here for CV-19 testing  Patient states exposure to CV-19 positive individual- 5 days ago  Denies any symptoms of CV-19 or fever

## 2022-08-31 ENCOUNTER — OFFICE VISIT (OUTPATIENT)
Dept: INTERNAL MEDICINE CLINIC | Facility: CLINIC | Age: 27
End: 2022-08-31
Payer: COMMERCIAL

## 2022-08-31 VITALS
SYSTOLIC BLOOD PRESSURE: 98 MMHG | DIASTOLIC BLOOD PRESSURE: 62 MMHG | BODY MASS INDEX: 18.44 KG/M2 | OXYGEN SATURATION: 100 % | HEART RATE: 59 BPM | HEIGHT: 64 IN | WEIGHT: 108 LBS | RESPIRATION RATE: 15 BRPM

## 2022-08-31 DIAGNOSIS — Z00.00 ANNUAL PHYSICAL EXAM: Primary | ICD-10-CM

## 2022-08-31 DIAGNOSIS — Z76.0 MEDICATION REFILL: ICD-10-CM

## 2022-08-31 DIAGNOSIS — F41.9 ANXIETY: ICD-10-CM

## 2022-08-31 DIAGNOSIS — G43.709 CHRONIC MIGRAINE WITHOUT AURA WITHOUT STATUS MIGRAINOSUS, NOT INTRACTABLE: ICD-10-CM

## 2022-08-31 DIAGNOSIS — N39.0 FREQUENT UTI: ICD-10-CM

## 2022-08-31 DIAGNOSIS — R55 SYNCOPE, UNSPECIFIED SYNCOPE TYPE: ICD-10-CM

## 2022-08-31 LAB
ALBUMIN SERPL-MCNC: 4.2 G/DL (ref 3.4–5)
ALBUMIN/GLOB SERPL: 1.2 {RATIO} (ref 1–2)
ALP LIVER SERPL-CCNC: 52 U/L
ALT SERPL-CCNC: 26 U/L
ANION GAP SERPL CALC-SCNC: 4 MMOL/L (ref 0–18)
AST SERPL-CCNC: 20 U/L (ref 15–37)
BASOPHILS # BLD AUTO: 0.04 X10(3) UL (ref 0–0.2)
BASOPHILS NFR BLD AUTO: 0.6 %
BILIRUB SERPL-MCNC: 0.4 MG/DL (ref 0.1–2)
BILIRUB UR QL: NEGATIVE
BUN BLD-MCNC: 8 MG/DL (ref 7–18)
BUN/CREAT SERPL: 10.3 (ref 10–20)
CALCIUM BLD-MCNC: 9.5 MG/DL (ref 8.5–10.1)
CHLORIDE SERPL-SCNC: 107 MMOL/L (ref 98–112)
CHOLEST SERPL-MCNC: 158 MG/DL (ref ?–200)
CO2 SERPL-SCNC: 27 MMOL/L (ref 21–32)
COLOR UR: YELLOW
CREAT BLD-MCNC: 0.78 MG/DL
DEPRECATED HBV CORE AB SER IA-ACNC: 32.5 NG/ML
DEPRECATED RDW RBC AUTO: 42.5 FL (ref 35.1–46.3)
EOSINOPHIL # BLD AUTO: 0.04 X10(3) UL (ref 0–0.7)
EOSINOPHIL NFR BLD AUTO: 0.6 %
ERYTHROCYTE [DISTWIDTH] IN BLOOD BY AUTOMATED COUNT: 12.2 % (ref 11–15)
FASTING PATIENT LIPID ANSWER: YES
FASTING STATUS PATIENT QL REPORTED: YES
GFR SERPLBLD BASED ON 1.73 SQ M-ARVRAT: 108 ML/MIN/1.73M2 (ref 60–?)
GLOBULIN PLAS-MCNC: 3.5 G/DL (ref 2.8–4.4)
GLUCOSE BLD-MCNC: 61 MG/DL (ref 70–99)
GLUCOSE UR-MCNC: NEGATIVE MG/DL
HCT VFR BLD AUTO: 37.4 %
HDLC SERPL-MCNC: 51 MG/DL (ref 40–59)
HGB BLD-MCNC: 12.2 G/DL
HGB UR QL STRIP.AUTO: NEGATIVE
IMM GRANULOCYTES # BLD AUTO: 0.02 X10(3) UL (ref 0–1)
IMM GRANULOCYTES NFR BLD: 0.3 %
IRON SATN MFR SERPL: 18 %
IRON SERPL-MCNC: 67 UG/DL
KETONES UR-MCNC: NEGATIVE MG/DL
LDLC SERPL CALC-MCNC: 95 MG/DL (ref ?–100)
LEUKOCYTE ESTERASE UR QL STRIP.AUTO: NEGATIVE
LYMPHOCYTES # BLD AUTO: 1.76 X10(3) UL (ref 1–4)
LYMPHOCYTES NFR BLD AUTO: 27.6 %
MCH RBC QN AUTO: 30.6 PG (ref 26–34)
MCHC RBC AUTO-ENTMCNC: 32.6 G/DL (ref 31–37)
MCV RBC AUTO: 93.7 FL
MONOCYTES # BLD AUTO: 0.4 X10(3) UL (ref 0.1–1)
MONOCYTES NFR BLD AUTO: 6.3 %
NEUTROPHILS # BLD AUTO: 4.12 X10 (3) UL (ref 1.5–7.7)
NEUTROPHILS # BLD AUTO: 4.12 X10(3) UL (ref 1.5–7.7)
NEUTROPHILS NFR BLD AUTO: 64.6 %
NITRITE UR QL STRIP.AUTO: NEGATIVE
NONHDLC SERPL-MCNC: 107 MG/DL (ref ?–130)
OSMOLALITY SERPL CALC.SUM OF ELEC: 282 MOSM/KG (ref 275–295)
PH UR: 7.5 [PH] (ref 5–8)
PLATELET # BLD AUTO: 299 10(3)UL (ref 150–450)
POTASSIUM SERPL-SCNC: 3.9 MMOL/L (ref 3.5–5.1)
PROT SERPL-MCNC: 7.7 G/DL (ref 6.4–8.2)
PROT UR-MCNC: NEGATIVE MG/DL
RBC # BLD AUTO: 3.99 X10(6)UL
SODIUM SERPL-SCNC: 138 MMOL/L (ref 136–145)
SP GR UR STRIP: 1.02 (ref 1–1.03)
TIBC SERPL-MCNC: 373 UG/DL (ref 240–450)
TRANSFERRIN SERPL-MCNC: 250 MG/DL (ref 200–360)
TRIGL SERPL-MCNC: 62 MG/DL (ref 30–149)
TSI SER-ACNC: 1 MIU/ML (ref 0.36–3.74)
UROBILINOGEN UR STRIP-ACNC: 0.2
VIT D+METAB SERPL-MCNC: 34.3 NG/ML (ref 30–100)
VLDLC SERPL CALC-MCNC: 10 MG/DL (ref 0–30)
WBC # BLD AUTO: 6.4 X10(3) UL (ref 4–11)

## 2022-08-31 PROCEDURE — 84466 ASSAY OF TRANSFERRIN: CPT | Performed by: INTERNAL MEDICINE

## 2022-08-31 PROCEDURE — 81001 URINALYSIS AUTO W/SCOPE: CPT | Performed by: INTERNAL MEDICINE

## 2022-08-31 PROCEDURE — 99214 OFFICE O/P EST MOD 30 MIN: CPT | Performed by: INTERNAL MEDICINE

## 2022-08-31 PROCEDURE — 3008F BODY MASS INDEX DOCD: CPT | Performed by: INTERNAL MEDICINE

## 2022-08-31 PROCEDURE — 84443 ASSAY THYROID STIM HORMONE: CPT | Performed by: INTERNAL MEDICINE

## 2022-08-31 PROCEDURE — 99395 PREV VISIT EST AGE 18-39: CPT | Performed by: INTERNAL MEDICINE

## 2022-08-31 PROCEDURE — 83540 ASSAY OF IRON: CPT | Performed by: INTERNAL MEDICINE

## 2022-08-31 PROCEDURE — 87591 N.GONORRHOEAE DNA AMP PROB: CPT | Performed by: INTERNAL MEDICINE

## 2022-08-31 PROCEDURE — 3074F SYST BP LT 130 MM HG: CPT | Performed by: INTERNAL MEDICINE

## 2022-08-31 PROCEDURE — 82728 ASSAY OF FERRITIN: CPT | Performed by: INTERNAL MEDICINE

## 2022-08-31 PROCEDURE — 3078F DIAST BP <80 MM HG: CPT | Performed by: INTERNAL MEDICINE

## 2022-08-31 PROCEDURE — 85025 COMPLETE CBC W/AUTO DIFF WBC: CPT | Performed by: INTERNAL MEDICINE

## 2022-08-31 PROCEDURE — 82306 VITAMIN D 25 HYDROXY: CPT | Performed by: INTERNAL MEDICINE

## 2022-08-31 PROCEDURE — 80053 COMPREHEN METABOLIC PANEL: CPT | Performed by: INTERNAL MEDICINE

## 2022-08-31 PROCEDURE — 87491 CHLMYD TRACH DNA AMP PROBE: CPT | Performed by: INTERNAL MEDICINE

## 2022-08-31 PROCEDURE — 80061 LIPID PANEL: CPT | Performed by: INTERNAL MEDICINE

## 2022-08-31 PROCEDURE — 81015 MICROSCOPIC EXAM OF URINE: CPT | Performed by: INTERNAL MEDICINE

## 2022-08-31 RX ORDER — FLUOXETINE HYDROCHLORIDE 20 MG/1
20 CAPSULE ORAL DAILY
Qty: 90 CAPSULE | Refills: 3 | Status: SHIPPED | OUTPATIENT
Start: 2022-08-31 | End: 2022-11-29

## 2022-08-31 RX ORDER — CLONAZEPAM 0.5 MG/1
0.5 TABLET ORAL 2 TIMES DAILY PRN
Qty: 60 TABLET | Refills: 2 | Status: SHIPPED | OUTPATIENT
Start: 2022-08-31

## 2022-09-01 DIAGNOSIS — R31.29 MICROSCOPIC HEMATURIA: Primary | ICD-10-CM

## 2022-09-01 LAB
C TRACH DNA SPEC QL NAA+PROBE: NEGATIVE
N GONORRHOEA DNA SPEC QL NAA+PROBE: NEGATIVE

## 2022-10-14 ENCOUNTER — APPOINTMENT (OUTPATIENT)
Dept: CT IMAGING | Facility: HOSPITAL | Age: 27
End: 2022-10-14
Attending: EMERGENCY MEDICINE
Payer: COMMERCIAL

## 2022-10-14 ENCOUNTER — HOSPITAL ENCOUNTER (EMERGENCY)
Facility: HOSPITAL | Age: 27
Discharge: HOME | End: 2022-10-14
Attending: EMERGENCY MEDICINE
Payer: COMMERCIAL

## 2022-10-14 VITALS
DIASTOLIC BLOOD PRESSURE: 58 MMHG | WEIGHT: 110 LBS | SYSTOLIC BLOOD PRESSURE: 100 MMHG | OXYGEN SATURATION: 100 % | TEMPERATURE: 98 F | RESPIRATION RATE: 23 BRPM | HEART RATE: 77 BPM | BODY MASS INDEX: 18.78 KG/M2 | HEIGHT: 64 IN

## 2022-10-14 DIAGNOSIS — E87.6 HYPOKALEMIA: Primary | ICD-10-CM

## 2022-10-14 DIAGNOSIS — S09.90XA INJURY OF HEAD, INITIAL ENCOUNTER: ICD-10-CM

## 2022-10-14 DIAGNOSIS — R55 SYNCOPE AND COLLAPSE: ICD-10-CM

## 2022-10-14 LAB
ALBUMIN SERPL-MCNC: 3.8 G/DL (ref 3.4–5)
ALBUMIN/GLOB SERPL: 1.1 {RATIO} (ref 1–2)
ALP LIVER SERPL-CCNC: 57 U/L
ALT SERPL-CCNC: 21 U/L
ANION GAP SERPL CALC-SCNC: 5 MMOL/L (ref 0–18)
AST SERPL-CCNC: 18 U/L (ref 15–37)
ATRIAL RATE: 74 BPM
BASOPHILS # BLD AUTO: 0.06 X10(3) UL (ref 0–0.2)
BASOPHILS NFR BLD AUTO: 0.3 %
BILIRUB SERPL-MCNC: 0.2 MG/DL (ref 0.1–2)
BUN BLD-MCNC: 8 MG/DL (ref 7–18)
CALCIUM BLD-MCNC: 8.7 MG/DL (ref 8.5–10.1)
CHLORIDE SERPL-SCNC: 108 MMOL/L (ref 98–112)
CO2 SERPL-SCNC: 25 MMOL/L (ref 21–32)
CREAT BLD-MCNC: 0.8 MG/DL
EOSINOPHIL # BLD AUTO: 0.09 X10(3) UL (ref 0–0.7)
EOSINOPHIL NFR BLD AUTO: 0.5 %
ERYTHROCYTE [DISTWIDTH] IN BLOOD BY AUTOMATED COUNT: 11.9 %
GFR SERPLBLD BASED ON 1.73 SQ M-ARVRAT: 105 ML/MIN/1.73M2 (ref 60–?)
GLOBULIN PLAS-MCNC: 3.5 G/DL (ref 2.8–4.4)
GLUCOSE BLD-MCNC: 145 MG/DL (ref 70–99)
HCT VFR BLD AUTO: 34.7 %
HGB BLD-MCNC: 11.8 G/DL
IMM GRANULOCYTES # BLD AUTO: 0.08 X10(3) UL (ref 0–1)
IMM GRANULOCYTES NFR BLD: 0.4 %
LYMPHOCYTES # BLD AUTO: 3.51 X10(3) UL (ref 1–4)
LYMPHOCYTES NFR BLD AUTO: 18.4 %
MCH RBC QN AUTO: 31.2 PG (ref 26–34)
MCHC RBC AUTO-ENTMCNC: 34 G/DL (ref 31–37)
MCV RBC AUTO: 91.8 FL
MONOCYTES # BLD AUTO: 0.88 X10(3) UL (ref 0.1–1)
MONOCYTES NFR BLD AUTO: 4.6 %
NEUTROPHILS # BLD AUTO: 14.42 X10 (3) UL (ref 1.5–7.7)
NEUTROPHILS # BLD AUTO: 14.42 X10(3) UL (ref 1.5–7.7)
NEUTROPHILS NFR BLD AUTO: 75.8 %
OSMOLALITY SERPL CALC.SUM OF ELEC: 287 MOSM/KG (ref 275–295)
P AXIS: 58 DEGREES
P-R INTERVAL: 162 MS
PLATELET # BLD AUTO: 282 10(3)UL (ref 150–450)
POTASSIUM SERPL-SCNC: 2.9 MMOL/L (ref 3.5–5.1)
PROT SERPL-MCNC: 7.3 G/DL (ref 6.4–8.2)
Q-T INTERVAL: 410 MS
QRS DURATION: 96 MS
QTC CALCULATION (BEZET): 455 MS
R AXIS: 85 DEGREES
RBC # BLD AUTO: 3.78 X10(6)UL
SODIUM SERPL-SCNC: 138 MMOL/L (ref 136–145)
T AXIS: 19 DEGREES
TROPONIN I HIGH SENSITIVITY: 3 NG/L
VENTRICULAR RATE: 74 BPM
WBC # BLD AUTO: 19 X10(3) UL (ref 4–11)

## 2022-10-14 PROCEDURE — 99285 EMERGENCY DEPT VISIT HI MDM: CPT

## 2022-10-14 PROCEDURE — 96360 HYDRATION IV INFUSION INIT: CPT

## 2022-10-14 PROCEDURE — 93010 ELECTROCARDIOGRAM REPORT: CPT

## 2022-10-14 PROCEDURE — 85025 COMPLETE CBC W/AUTO DIFF WBC: CPT | Performed by: EMERGENCY MEDICINE

## 2022-10-14 PROCEDURE — 99284 EMERGENCY DEPT VISIT MOD MDM: CPT

## 2022-10-14 PROCEDURE — 70450 CT HEAD/BRAIN W/O DYE: CPT | Performed by: EMERGENCY MEDICINE

## 2022-10-14 PROCEDURE — 80053 COMPREHEN METABOLIC PANEL: CPT | Performed by: EMERGENCY MEDICINE

## 2022-10-14 PROCEDURE — 84484 ASSAY OF TROPONIN QUANT: CPT | Performed by: EMERGENCY MEDICINE

## 2022-10-14 PROCEDURE — 93005 ELECTROCARDIOGRAM TRACING: CPT

## 2022-10-14 RX ORDER — POTASSIUM CHLORIDE 20 MEQ/1
40 TABLET, EXTENDED RELEASE ORAL ONCE
Status: COMPLETED | OUTPATIENT
Start: 2022-10-14 | End: 2022-10-14

## 2022-10-14 NOTE — ED INITIAL ASSESSMENT (HPI)
PT REPORTS SYNCOPAL EPISODE AT HOME, HITTING BACK OF HEAD. +NECK HURTS ALSO. REPORTS HAVING APPROX 5 SYNCOPAL EPISODES IN THE PAST.

## 2022-10-15 DIAGNOSIS — G43.709 CHRONIC MIGRAINE WITHOUT AURA WITHOUT STATUS MIGRAINOSUS, NOT INTRACTABLE: ICD-10-CM

## 2022-10-15 DIAGNOSIS — R55 SYNCOPE, UNSPECIFIED SYNCOPE TYPE: ICD-10-CM

## 2022-10-15 DIAGNOSIS — Z76.0 MEDICATION REFILL: ICD-10-CM

## 2022-10-15 DIAGNOSIS — F41.9 ANXIETY: ICD-10-CM

## 2022-10-15 DIAGNOSIS — Z00.00 ANNUAL PHYSICAL EXAM: ICD-10-CM

## 2022-10-15 DIAGNOSIS — N39.0 FREQUENT UTI: ICD-10-CM

## 2022-10-17 RX ORDER — FLUOXETINE HYDROCHLORIDE 20 MG/1
20 CAPSULE ORAL DAILY
Qty: 90 CAPSULE | Refills: 3 | Status: SHIPPED | OUTPATIENT
Start: 2022-10-17 | End: 2023-08-23

## 2023-04-25 DIAGNOSIS — Z76.0 MEDICATION REFILL: ICD-10-CM

## 2023-04-25 DIAGNOSIS — Z00.00 ANNUAL PHYSICAL EXAM: ICD-10-CM

## 2023-04-25 DIAGNOSIS — R55 SYNCOPE, UNSPECIFIED SYNCOPE TYPE: ICD-10-CM

## 2023-04-25 DIAGNOSIS — N39.0 FREQUENT UTI: ICD-10-CM

## 2023-04-25 DIAGNOSIS — G43.709 CHRONIC MIGRAINE WITHOUT AURA WITHOUT STATUS MIGRAINOSUS, NOT INTRACTABLE: ICD-10-CM

## 2023-04-25 DIAGNOSIS — F41.9 ANXIETY: ICD-10-CM

## 2023-04-25 RX ORDER — CLONAZEPAM 0.5 MG/1
0.5 TABLET ORAL 2 TIMES DAILY PRN
Qty: 60 TABLET | Refills: 2 | Status: SHIPPED | OUTPATIENT
Start: 2023-04-25

## 2023-04-25 NOTE — TELEPHONE ENCOUNTER
Patient said she has an upcoming appointment with Dr. George Yanez on 5/1. She is asking for a partial refill of Clonazepam 0.5 MG Oral Tab medication.     [address] [facility] [address] [place] [address], [address] [place] [address] [phone], [phone]

## 2023-05-01 ENCOUNTER — TELEPHONE (OUTPATIENT)
Dept: INTERNAL MEDICINE CLINIC | Facility: CLINIC | Age: 28
End: 2023-05-01

## 2023-05-01 ENCOUNTER — OFFICE VISIT (OUTPATIENT)
Dept: INTERNAL MEDICINE CLINIC | Facility: CLINIC | Age: 28
End: 2023-05-01
Payer: COMMERCIAL

## 2023-05-01 VITALS
BODY MASS INDEX: 19.74 KG/M2 | DIASTOLIC BLOOD PRESSURE: 68 MMHG | WEIGHT: 115.63 LBS | HEIGHT: 64 IN | OXYGEN SATURATION: 96 % | SYSTOLIC BLOOD PRESSURE: 120 MMHG | HEART RATE: 74 BPM

## 2023-05-01 DIAGNOSIS — R31.29 MICROSCOPIC HEMATURIA: ICD-10-CM

## 2023-05-01 DIAGNOSIS — E87.6 HYPOKALEMIA: ICD-10-CM

## 2023-05-01 DIAGNOSIS — R79.9 ABNORMAL BLOOD CHEMISTRY: ICD-10-CM

## 2023-05-01 DIAGNOSIS — Z30.09 BIRTH CONTROL COUNSELING: ICD-10-CM

## 2023-05-01 DIAGNOSIS — F41.1 GAD (GENERALIZED ANXIETY DISORDER): ICD-10-CM

## 2023-05-01 DIAGNOSIS — R55 SYNCOPE, UNSPECIFIED SYNCOPE TYPE: ICD-10-CM

## 2023-05-01 DIAGNOSIS — L70.9 ACNE, UNSPECIFIED ACNE TYPE: Primary | ICD-10-CM

## 2023-05-01 DIAGNOSIS — D64.9 ANEMIA, UNSPECIFIED TYPE: ICD-10-CM

## 2023-05-01 LAB
ALBUMIN SERPL-MCNC: 4.2 G/DL (ref 3.4–5)
ALBUMIN/GLOB SERPL: 1.3 {RATIO} (ref 1–2)
ALP LIVER SERPL-CCNC: 47 U/L
ALT SERPL-CCNC: 25 U/L
ANION GAP SERPL CALC-SCNC: 6 MMOL/L (ref 0–18)
AST SERPL-CCNC: 23 U/L (ref 15–37)
BASOPHILS # BLD AUTO: 0.06 X10(3) UL (ref 0–0.2)
BASOPHILS NFR BLD AUTO: 1 %
BILIRUB SERPL-MCNC: 0.3 MG/DL (ref 0.1–2)
BILIRUB UR QL: NEGATIVE
BUN BLD-MCNC: 10 MG/DL (ref 7–18)
BUN/CREAT SERPL: 15.6 (ref 10–20)
CALCIUM BLD-MCNC: 9.3 MG/DL (ref 8.5–10.1)
CHLORIDE SERPL-SCNC: 107 MMOL/L (ref 98–112)
CO2 SERPL-SCNC: 28 MMOL/L (ref 21–32)
COLOR UR: YELLOW
CONTROL LINE PRESENT WITH A CLEAR BACKGROUND (YES/NO): YES YES/NO
CREAT BLD-MCNC: 0.64 MG/DL
DEPRECATED RDW RBC AUTO: 40.1 FL (ref 35.1–46.3)
EOSINOPHIL # BLD AUTO: 0.08 X10(3) UL (ref 0–0.7)
EOSINOPHIL NFR BLD AUTO: 1.4 %
ERYTHROCYTE [DISTWIDTH] IN BLOOD BY AUTOMATED COUNT: 11.8 % (ref 11–15)
FASTING STATUS PATIENT QL REPORTED: YES
GFR SERPLBLD BASED ON 1.73 SQ M-ARVRAT: 125 ML/MIN/1.73M2 (ref 60–?)
GLOBULIN PLAS-MCNC: 3.3 G/DL (ref 2.8–4.4)
GLUCOSE BLD-MCNC: 82 MG/DL (ref 70–99)
GLUCOSE UR-MCNC: NORMAL MG/DL
HCT VFR BLD AUTO: 33.9 %
HGB BLD-MCNC: 11.4 G/DL
HGB UR QL STRIP.AUTO: NEGATIVE
IMM GRANULOCYTES # BLD AUTO: 0.01 X10(3) UL (ref 0–1)
IMM GRANULOCYTES NFR BLD: 0.2 %
KETONES UR-MCNC: NEGATIVE MG/DL
KIT LOT #: NORMAL NUMERIC
LEUKOCYTE ESTERASE UR QL STRIP.AUTO: NEGATIVE
LYMPHOCYTES # BLD AUTO: 2.01 X10(3) UL (ref 1–4)
LYMPHOCYTES NFR BLD AUTO: 34.4 %
MCH RBC QN AUTO: 31 PG (ref 26–34)
MCHC RBC AUTO-ENTMCNC: 33.6 G/DL (ref 31–37)
MCV RBC AUTO: 92.1 FL
MONOCYTES # BLD AUTO: 0.43 X10(3) UL (ref 0.1–1)
MONOCYTES NFR BLD AUTO: 7.4 %
NEUTROPHILS # BLD AUTO: 3.26 X10 (3) UL (ref 1.5–7.7)
NEUTROPHILS # BLD AUTO: 3.26 X10(3) UL (ref 1.5–7.7)
NEUTROPHILS NFR BLD AUTO: 55.6 %
NITRITE UR QL STRIP.AUTO: NEGATIVE
OSMOLALITY SERPL CALC.SUM OF ELEC: 290 MOSM/KG (ref 275–295)
PH UR: 6.5 [PH] (ref 5–8)
PLATELET # BLD AUTO: 322 10(3)UL (ref 150–450)
POTASSIUM SERPL-SCNC: 3.8 MMOL/L (ref 3.5–5.1)
PROT SERPL-MCNC: 7.5 G/DL (ref 6.4–8.2)
PROT UR-MCNC: 30 MG/DL
RBC # BLD AUTO: 3.68 X10(6)UL
SODIUM SERPL-SCNC: 141 MMOL/L (ref 136–145)
SP GR UR STRIP: 1.02 (ref 1–1.03)
UROBILINOGEN UR STRIP-ACNC: 2
WBC # BLD AUTO: 5.9 X10(3) UL (ref 4–11)

## 2023-05-01 PROCEDURE — 82607 VITAMIN B-12: CPT | Performed by: INTERNAL MEDICINE

## 2023-05-01 PROCEDURE — 84466 ASSAY OF TRANSFERRIN: CPT | Performed by: INTERNAL MEDICINE

## 2023-05-01 PROCEDURE — 82728 ASSAY OF FERRITIN: CPT | Performed by: INTERNAL MEDICINE

## 2023-05-01 PROCEDURE — 80053 COMPREHEN METABOLIC PANEL: CPT | Performed by: INTERNAL MEDICINE

## 2023-05-01 PROCEDURE — 85025 COMPLETE CBC W/AUTO DIFF WBC: CPT | Performed by: INTERNAL MEDICINE

## 2023-05-01 PROCEDURE — 83540 ASSAY OF IRON: CPT | Performed by: INTERNAL MEDICINE

## 2023-05-01 PROCEDURE — 81001 URINALYSIS AUTO W/SCOPE: CPT | Performed by: INTERNAL MEDICINE

## 2023-05-01 RX ORDER — TRETINOIN 0.5 MG/G
1 CREAM TOPICAL NIGHTLY
Qty: 20 G | Refills: 0 | Status: SHIPPED | OUTPATIENT
Start: 2023-05-01

## 2023-05-01 RX ORDER — NORGESTIMATE AND ETHINYL ESTRADIOL 7DAYSX3 LO
1 KIT ORAL DAILY
Qty: 28 TABLET | Refills: 2 | Status: SHIPPED | OUTPATIENT
Start: 2023-05-01

## 2023-05-01 NOTE — TELEPHONE ENCOUNTER
[address] called. The Clindamycin medication doesn't come in the strength that was sent. Please resend a new prescription to [facility].

## 2023-05-02 DIAGNOSIS — D64.9 ANEMIA, UNSPECIFIED TYPE: Primary | ICD-10-CM

## 2023-05-02 LAB
DEPRECATED HBV CORE AB SER IA-ACNC: 48 NG/ML
IRON SATN MFR SERPL: 28 %
IRON SERPL-MCNC: 107 UG/DL
TIBC SERPL-MCNC: 380 UG/DL (ref 240–450)
TRANSFERRIN SERPL-MCNC: 255 MG/DL (ref 200–360)
VIT B12 SERPL-MCNC: 696 PG/ML (ref 193–986)

## 2023-08-23 ENCOUNTER — TELEMEDICINE (OUTPATIENT)
Dept: INTERNAL MEDICINE CLINIC | Facility: CLINIC | Age: 28
End: 2023-08-23
Payer: COMMERCIAL

## 2023-08-23 DIAGNOSIS — F41.9 ANXIETY AND DEPRESSION: ICD-10-CM

## 2023-08-23 DIAGNOSIS — F32.A ANXIETY AND DEPRESSION: ICD-10-CM

## 2023-08-23 DIAGNOSIS — R55 SYNCOPE, UNSPECIFIED SYNCOPE TYPE: ICD-10-CM

## 2023-08-23 DIAGNOSIS — G43.709 CHRONIC MIGRAINE WITHOUT AURA WITHOUT STATUS MIGRAINOSUS, NOT INTRACTABLE: ICD-10-CM

## 2023-08-23 DIAGNOSIS — Z76.0 MEDICATION REFILL: ICD-10-CM

## 2023-08-23 DIAGNOSIS — D64.9 ANEMIA, UNSPECIFIED TYPE: Primary | ICD-10-CM

## 2023-08-23 DIAGNOSIS — Z00.00 ANNUAL PHYSICAL EXAM: ICD-10-CM

## 2023-08-23 DIAGNOSIS — F41.9 ANXIETY: ICD-10-CM

## 2023-08-23 DIAGNOSIS — N39.0 FREQUENT UTI: ICD-10-CM

## 2023-08-23 PROCEDURE — 99214 OFFICE O/P EST MOD 30 MIN: CPT | Performed by: INTERNAL MEDICINE

## 2023-08-23 RX ORDER — FLUOXETINE HYDROCHLORIDE 20 MG/1
20 CAPSULE ORAL DAILY
Qty: 90 CAPSULE | Refills: 3 | Status: SHIPPED | OUTPATIENT
Start: 2023-08-23

## 2023-08-23 RX ORDER — CLONAZEPAM 0.5 MG/1
0.5 TABLET ORAL 2 TIMES DAILY PRN
Qty: 60 TABLET | Refills: 2 | Status: SHIPPED | OUTPATIENT
Start: 2023-08-23

## 2023-08-23 NOTE — PROGRESS NOTES
This visit was conducted using telemedicine with live interactive video and audio   Patient verbally consents to conduct video visit   Patient understands and accepts financial responsibility for any deductible, co-insurance and/or co-pays associated with this service. Shima Sanchez is a 33year old female. CC: medication refill and follow up on chronic conditions     HPI:Patient with PMH as listed below scheduled a visit for :      Anxiety and depression   On prozac   And clonazepam just at night   Needs refills   No chest pain   No sob   No palpitations         Syncope   Didn't have the chance to see neurology and cardiologist   No more attacks since last visit         Anemia   She is taking multivitamins         Past Medical History:   Diagnosis Date    Anxiety with depression     Blepharitis 2011    NG:  Blehpharitis, OU, 2011    Eyelashes turned in     NG: Aberant lashes, OU, 9/26/11; Management: Removed in office, OU, 2011    Infectious mononucleosis     Keratitis 2011    NG:  Keratitis, OU, 9/26/11    Migraines     with aura. Medication helps. Ovarian cyst 2016    history of bilateral cysts that have ruptured in the past    Varicella 1998    NG: Varicella disease       Past Surgical History:   Procedure Laterality Date    UPPER GI ENDOSCOPY,BIOPSY  12/14/18= Normal.  Gastric/SB Bx normal      Current Outpatient Medications   Medication Sig Dispense Refill    clonazePAM 0.5 MG Oral Tab Take 1 tablet (0.5 mg total) by mouth 2 (two) times daily as needed for Anxiety. 60 tablet 2    FLUoxetine 20 MG Oral Cap Take 1 capsule (20 mg total) by mouth daily. 90 capsule 3    Norgestim-Eth Estrad Triphasic (ORTHO TRI-CYCLEN LO) 0.18/0.215/0.25 MG-25 MCG Oral Tab Take 1 tablet by mouth daily. 28 tablet 2    Tretinoin 0.05 % External Cream Apply 1 Application topically nightly.  20 g 0    albuterol 108 (90 Base) MCG/ACT Inhalation Aero Soln Inhale 2 puffs into the lungs every 6 (six) hours as needed for Wheezing or Shortness of Breath. 1 each 1    SUMAtriptan Succinate 100 MG Oral Tab Take 1 tablet (100 mg total) by mouth every 2 (two) hours as needed for Migraine. REPEAT ONCE AFTER 2 HOURS ONLY 2 IN 24 HOURS MAX 9 tablet 5    ibuprofen 600 MG Oral Tab Take 1 tablet (600 mg total) by mouth every 8 (eight) hours as needed for Pain. Patient Active Problem List:     Routine infant or child health check     Acne     Myopia with astigmatism     High serum cortisol     Anxiety and depression     Medication refill     Anemia    Family History   Problem Relation Age of Onset    Anemia Mother     Other [facility]) Mother     Other (lupus) Paternal Grandmother     Other (RA) Paternal Grandmother     Diabetes Neg         NG: No family history of diabetes     Heart Attack Neg         NG: No history of early cardiac disease/sudden death     Glaucoma Neg     Macular degeneration Neg     Heart Disorder Neg     Bleeding Disorders Neg     Clotting Disorder Neg     Cancer Neg          REVIEW OF SYSTEMS:    A comprehensive 10 point review of systems was completed. Pertinent positives and negatives noted in the the HPI. EXAM was conducted through video     General: She is not in acute distress, normal appearance, not ill appearing  Pulmonary/ chest:  Speaking in full sentences, no increased work of breathing  Psychiatric: Behavior is normal, normal affect  Skin: No visible lesions  Extremities: no obvious extremity swelling noted       ASSESSMENT AND PLAN:  Yolanda Yarbrough is a 33year old female.   (D64.9) Anemia, unspecified type  (primary encounter diagnosis)    (Z76.0) Medication refill  Plan: clonazePAM 0.5 MG Oral Tab, FLUoxetine 20 MG         Oral Cap    (F41.9,  F32.A) Anxiety and depression    (R55) Syncope, unspecified syncope type  Plan: clonazePAM 0.5 MG Oral Tab, FLUoxetine 20 MG         Oral Cap    (F41.9) Anxiety  Plan: clonazePAM 0.5 MG Oral Tab, FLUoxetine 20 MG         Oral Cap    (N39.0) Frequent UTI  Plan: clonazePAM 0.5 MG Oral Tab, FLUoxetine 20 MG         Oral Cap    (Z00.00) Annual physical exam  Plan: clonazePAM 0.5 MG Oral Tab, FLUoxetine 20 MG         Oral Cap    (G43.709) Chronic migraine without aura without status migrainosus, not intractable  Plan: clonazePAM 0.5 MG Oral Tab, FLUoxetine 20 MG         Oral Cap   Refilled prozac and clonazepam   Discussed the importance of scheduling appts with neurology and cardiology for evaluation of syncope   Schedule annual physical exam with fasting blood work in 3 months       Meds & Refills for this Visit:  Requested Prescriptions     Signed Prescriptions Disp Refills    clonazePAM 0.5 MG Oral Tab 60 tablet 2     Sig: Take 1 tablet (0.5 mg total) by mouth 2 (two) times daily as needed for Anxiety. FLUoxetine 20 MG Oral Cap 90 capsule 3     Sig: Take 1 capsule (20 mg total) by mouth daily. Imaging & Consults:  None          Please note that the following visit was completed using two-way, real-time interactive audio and video communication. This has been done in good parker to provide continuity of care in the best interest of the provider-patient relationship, due to the ongoing public health crises/ national emergency  due to Joanna 19 and because of restrictions of visitation. Every conscious effort was taken to allow for sufficient and adequate time. Included in this visit, time may have been spent reviewing labs, medications, radiology tests and decision making. Appropriate medical decision-making and tests are ordered as detailed in the plan of care above. Coding/billing information is submitted for this visit based on complexity of care and/or time spent for the visit.

## 2023-08-30 ENCOUNTER — HOSPITAL ENCOUNTER (OUTPATIENT)
Age: 28
Discharge: HOME | End: 2023-08-30
Payer: COMMERCIAL

## 2023-08-30 VITALS
WEIGHT: 110 LBS | TEMPERATURE: 97 F | HEIGHT: 64 IN | DIASTOLIC BLOOD PRESSURE: 57 MMHG | SYSTOLIC BLOOD PRESSURE: 96 MMHG | OXYGEN SATURATION: 99 % | BODY MASS INDEX: 18.78 KG/M2 | RESPIRATION RATE: 18 BRPM | HEART RATE: 60 BPM

## 2023-08-30 DIAGNOSIS — H60.312 ACUTE DIFFUSE OTITIS EXTERNA OF LEFT EAR: Primary | ICD-10-CM

## 2023-08-30 PROCEDURE — 99213 OFFICE O/P EST LOW 20 MIN: CPT | Performed by: NURSE PRACTITIONER

## 2023-08-30 RX ORDER — NEOMYCIN SULFATE, POLYMYXIN B SULFATE AND HYDROCORTISONE 10; 3.5; 1 MG/ML; MG/ML; [USP'U]/ML
4 SUSPENSION/ DROPS AURICULAR (OTIC) 4 TIMES DAILY
Qty: 10 ML | Refills: 0 | Status: SHIPPED | OUTPATIENT
Start: 2023-08-30 | End: 2023-09-06

## 2023-09-25 ENCOUNTER — OFFICE VISIT (OUTPATIENT)
Dept: INTERNAL MEDICINE CLINIC | Facility: CLINIC | Age: 28
End: 2023-09-25
Payer: COMMERCIAL

## 2023-09-25 VITALS
OXYGEN SATURATION: 97 % | HEIGHT: 64 IN | BODY MASS INDEX: 19.18 KG/M2 | HEART RATE: 77 BPM | DIASTOLIC BLOOD PRESSURE: 58 MMHG | WEIGHT: 112.38 LBS | SYSTOLIC BLOOD PRESSURE: 90 MMHG

## 2023-09-25 DIAGNOSIS — Z00.00 ANNUAL PHYSICAL EXAM: Primary | ICD-10-CM

## 2023-09-25 DIAGNOSIS — R55 SYNCOPE, UNSPECIFIED SYNCOPE TYPE: ICD-10-CM

## 2023-09-25 DIAGNOSIS — F41.9 ANXIETY AND DEPRESSION: ICD-10-CM

## 2023-09-25 DIAGNOSIS — F32.A ANXIETY AND DEPRESSION: ICD-10-CM

## 2023-09-25 DIAGNOSIS — D64.9 ANEMIA, UNSPECIFIED TYPE: ICD-10-CM

## 2023-09-25 DIAGNOSIS — Z30.09 BIRTH CONTROL COUNSELING: ICD-10-CM

## 2023-09-25 LAB
BASOPHILS # BLD AUTO: 0.04 X10(3) UL (ref 0–0.2)
BASOPHILS NFR BLD AUTO: 0.6 %
CHOLEST SERPL-MCNC: 159 MG/DL (ref ?–200)
DEPRECATED RDW RBC AUTO: 41.6 FL (ref 35.1–46.3)
EOSINOPHIL # BLD AUTO: 0.06 X10(3) UL (ref 0–0.7)
EOSINOPHIL NFR BLD AUTO: 0.9 %
ERYTHROCYTE [DISTWIDTH] IN BLOOD BY AUTOMATED COUNT: 12.1 % (ref 11–15)
FASTING PATIENT LIPID ANSWER: YES
FOLATE SERPL-MCNC: 15.6 NG/ML (ref 8.7–?)
HCT VFR BLD AUTO: 34.8 %
HDLC SERPL-MCNC: 52 MG/DL (ref 40–59)
HGB BLD-MCNC: 11.5 G/DL
IMM GRANULOCYTES # BLD AUTO: 0.02 X10(3) UL (ref 0–1)
IMM GRANULOCYTES NFR BLD: 0.3 %
LDLC SERPL CALC-MCNC: 95 MG/DL (ref ?–100)
LYMPHOCYTES # BLD AUTO: 2.46 X10(3) UL (ref 1–4)
LYMPHOCYTES NFR BLD AUTO: 37.7 %
MCH RBC QN AUTO: 30.5 PG (ref 26–34)
MCHC RBC AUTO-ENTMCNC: 33 G/DL (ref 31–37)
MCV RBC AUTO: 92.3 FL
MONOCYTES # BLD AUTO: 0.48 X10(3) UL (ref 0.1–1)
MONOCYTES NFR BLD AUTO: 7.4 %
NEUTROPHILS # BLD AUTO: 3.47 X10 (3) UL (ref 1.5–7.7)
NEUTROPHILS # BLD AUTO: 3.47 X10(3) UL (ref 1.5–7.7)
NEUTROPHILS NFR BLD AUTO: 53.1 %
NONHDLC SERPL-MCNC: 107 MG/DL (ref ?–130)
PLATELET # BLD AUTO: 324 10(3)UL (ref 150–450)
RBC # BLD AUTO: 3.77 X10(6)UL
TRIGL SERPL-MCNC: 59 MG/DL (ref 30–149)
TSI SER-ACNC: 0.39 MIU/ML (ref 0.36–3.74)
VLDLC SERPL CALC-MCNC: 10 MG/DL (ref 0–30)
WBC # BLD AUTO: 6.5 X10(3) UL (ref 4–11)

## 2023-09-25 PROCEDURE — 84443 ASSAY THYROID STIM HORMONE: CPT | Performed by: INTERNAL MEDICINE

## 2023-09-25 PROCEDURE — 85025 COMPLETE CBC W/AUTO DIFF WBC: CPT | Performed by: INTERNAL MEDICINE

## 2023-09-25 PROCEDURE — 87591 N.GONORRHOEAE DNA AMP PROB: CPT | Performed by: INTERNAL MEDICINE

## 2023-09-25 PROCEDURE — 82746 ASSAY OF FOLIC ACID SERUM: CPT | Performed by: INTERNAL MEDICINE

## 2023-09-25 PROCEDURE — 80061 LIPID PANEL: CPT | Performed by: INTERNAL MEDICINE

## 2023-09-25 PROCEDURE — 87491 CHLMYD TRACH DNA AMP PROBE: CPT | Performed by: INTERNAL MEDICINE

## 2023-09-26 DIAGNOSIS — D64.9 ANEMIA, UNSPECIFIED TYPE: Primary | ICD-10-CM

## 2023-09-26 LAB
C TRACH DNA SPEC QL NAA+PROBE: NEGATIVE
N GONORRHOEA DNA SPEC QL NAA+PROBE: NEGATIVE

## 2023-11-03 ENCOUNTER — PATIENT MESSAGE (OUTPATIENT)
Dept: INTERNAL MEDICINE CLINIC | Facility: CLINIC | Age: 28
End: 2023-11-03

## 2023-11-06 RX ORDER — DOXYCYCLINE HYCLATE 50 MG/1
1 TABLET, FILM COATED ORAL 2 TIMES DAILY
Qty: 60 TABLET | Refills: 2 | Status: SHIPPED | OUTPATIENT
Start: 2023-11-06 | End: 2023-12-06

## 2023-11-07 ENCOUNTER — TELEPHONE (OUTPATIENT)
Dept: INTERNAL MEDICINE CLINIC | Facility: CLINIC | Age: 28
End: 2023-11-07

## 2023-11-07 RX ORDER — DOXYCYCLINE HYCLATE 100 MG
100 TABLET ORAL 2 TIMES DAILY
Qty: 60 TABLET | Refills: 1 | Status: SHIPPED | OUTPATIENT
Start: 2023-11-07 | End: 2023-12-07

## 2023-11-07 NOTE — TELEPHONE ENCOUNTER
Changed the dose to 100 mg   Please inform patient she can pay with good rx coupon and will be less than 10 dollars per month

## 2023-11-07 NOTE — TELEPHONE ENCOUNTER
Per Sylvain's request- [employer] message sent to patient to inform her of adjusted Rx dose, fact that she can use the Good [facility] moose to obtain Rx for <$10.

## 2023-11-07 NOTE — TELEPHONE ENCOUNTER
Deanna Le 11/3/2023 9:56 AM CDT    Please advise.   ----- Message -----  From: Sunita Ordoñez  Sent: 11/3/2023 9:42 AM CDT  To: Jackelyn Kovacs Clinical Staff  Subject: Medication Request     Good Morning Dr. Jose Ramos    I have been experiencing extremely bad breakouts that started shortly after my last appointment with you. In addition to being extremely embarrassing, they are extremely painful. Is there any way I can go back on the doxycycline to help with the cystic acne?

## 2023-11-07 NOTE — TELEPHONE ENCOUNTER
[address] called. The medication Doxycyline sent to the pharmacy isn't covered under this patient's insurance. Sudha Torres is asking if Dr. Leesa Samano can please send a different medication.

## 2024-01-09 RX ORDER — DOXYCYCLINE HYCLATE 100 MG
100 TABLET ORAL 2 TIMES DAILY
Qty: 60 TABLET | Refills: 0 | Status: SHIPPED | OUTPATIENT
Start: 2024-01-09

## 2024-01-09 NOTE — TELEPHONE ENCOUNTER
A refill request was received for:  Requested Prescriptions     Pending Prescriptions Disp Refills    DOXYCYCLINE HYCLATE 100 MG Oral Tab [Pharmacy Med Name: Doxycycline Hyclate 100 MG Oral Tablet] 60 tablet 0     Sig: Take 1 tablet by mouth twice daily     Last refill date:    11/7/23  Last office visit:   9/25/23    Future Appointments   Date Time Provider Department Center   1/23/2024  8:40 AM Deejay Narayan MD [facility] [address]

## 2024-01-23 ENCOUNTER — OFFICE VISIT (OUTPATIENT)
Dept: INTERNAL MEDICINE CLINIC | Facility: CLINIC | Age: 29
End: 2024-01-23
Payer: COMMERCIAL

## 2024-01-23 VITALS
WEIGHT: 111.19 LBS | HEIGHT: 64 IN | HEART RATE: 69 BPM | SYSTOLIC BLOOD PRESSURE: 120 MMHG | DIASTOLIC BLOOD PRESSURE: 82 MMHG | OXYGEN SATURATION: 98 % | BODY MASS INDEX: 18.98 KG/M2

## 2024-01-23 DIAGNOSIS — G56.03 BILATERAL CARPAL TUNNEL SYNDROME: ICD-10-CM

## 2024-01-23 DIAGNOSIS — H61.91 LESION OF RIGHT EARLOBE: ICD-10-CM

## 2024-01-23 DIAGNOSIS — Z76.0 MEDICATION REFILL: ICD-10-CM

## 2024-01-23 DIAGNOSIS — Z87.898 HISTORY OF SYNCOPE: ICD-10-CM

## 2024-01-23 DIAGNOSIS — G43.709 CHRONIC MIGRAINE WITHOUT AURA WITHOUT STATUS MIGRAINOSUS, NOT INTRACTABLE: ICD-10-CM

## 2024-01-23 DIAGNOSIS — D64.9 ANEMIA, UNSPECIFIED TYPE: Primary | ICD-10-CM

## 2024-01-23 DIAGNOSIS — F41.9 ANXIETY: ICD-10-CM

## 2024-01-23 LAB
BASOPHILS # BLD AUTO: 0.05 X10(3) UL (ref 0–0.2)
BASOPHILS NFR BLD AUTO: 0.7 %
DEPRECATED RDW RBC AUTO: 40.6 FL (ref 35.1–46.3)
EOSINOPHIL # BLD AUTO: 0.11 X10(3) UL (ref 0–0.7)
EOSINOPHIL NFR BLD AUTO: 1.6 %
ERYTHROCYTE [DISTWIDTH] IN BLOOD BY AUTOMATED COUNT: 12.1 % (ref 11–15)
HCT VFR BLD AUTO: 32.4 %
HGB BLD-MCNC: 10.7 G/DL
IMM GRANULOCYTES # BLD AUTO: 0.02 X10(3) UL (ref 0–1)
IMM GRANULOCYTES NFR BLD: 0.3 %
LYMPHOCYTES # BLD AUTO: 2.41 X10(3) UL (ref 1–4)
LYMPHOCYTES NFR BLD AUTO: 34.7 %
MCH RBC QN AUTO: 30 PG (ref 26–34)
MCHC RBC AUTO-ENTMCNC: 33 G/DL (ref 31–37)
MCV RBC AUTO: 90.8 FL
MONOCYTES # BLD AUTO: 0.45 X10(3) UL (ref 0.1–1)
MONOCYTES NFR BLD AUTO: 6.5 %
NEUTROPHILS # BLD AUTO: 3.9 X10 (3) UL (ref 1.5–7.7)
NEUTROPHILS # BLD AUTO: 3.9 X10(3) UL (ref 1.5–7.7)
NEUTROPHILS NFR BLD AUTO: 56.2 %
PLATELET # BLD AUTO: 284 10(3)UL (ref 150–450)
RBC # BLD AUTO: 3.57 X10(6)UL
WBC # BLD AUTO: 6.9 X10(3) UL (ref 4–11)

## 2024-01-23 PROCEDURE — 85025 COMPLETE CBC W/AUTO DIFF WBC: CPT | Performed by: INTERNAL MEDICINE

## 2024-01-23 PROCEDURE — 99214 OFFICE O/P EST MOD 30 MIN: CPT | Performed by: INTERNAL MEDICINE

## 2024-01-23 PROCEDURE — 3074F SYST BP LT 130 MM HG: CPT | Performed by: INTERNAL MEDICINE

## 2024-01-23 PROCEDURE — 3079F DIAST BP 80-89 MM HG: CPT | Performed by: INTERNAL MEDICINE

## 2024-01-23 PROCEDURE — 3008F BODY MASS INDEX DOCD: CPT | Performed by: INTERNAL MEDICINE

## 2024-01-23 RX ORDER — CLONAZEPAM 0.5 MG/1
0.5 TABLET ORAL 2 TIMES DAILY PRN
Qty: 60 TABLET | Refills: 2 | Status: SHIPPED | OUTPATIENT
Start: 2024-01-23

## 2024-01-23 RX ORDER — MELOXICAM 15 MG/1
15 TABLET ORAL DAILY
Qty: 7 TABLET | Refills: 0 | Status: SHIPPED | OUTPATIENT
Start: 2024-01-23 | End: 2024-01-30

## 2024-01-23 RX ORDER — DOXYCYCLINE HYCLATE 100 MG
100 TABLET ORAL DAILY
Qty: 90 TABLET | Refills: 0 | Status: SHIPPED | OUTPATIENT
Start: 2024-01-23 | End: 2024-04-22

## 2024-01-23 RX ORDER — FLUOXETINE HYDROCHLORIDE 20 MG/1
20 CAPSULE ORAL DAILY
Qty: 90 CAPSULE | Refills: 3 | Status: SHIPPED | OUTPATIENT
Start: 2024-01-23

## 2024-01-23 NOTE — PROGRESS NOTES
Lluvia Duarte is a 28 year old female.    Chief complaint:  follow up on chronic conditions       HPI:     Lluvia Duarte is a 28 year old pleasant female who presents for a     Bilateral hand pain   She gets a feeling at the base of her hands   Wrist   Hard to open cans           Anxiety   On prozac and clonazepam   Doing well        Acne   On doxycycline   BID   Helped a lot           Anemia         History of syncope   Did see cardiology   Had a holter monitor       Stretching of the earlobe piercing   Would like to see a specialist for consultation               Current Outpatient Medications   Medication Sig Dispense Refill    Doxycycline Hyclate 100 MG Oral Tab Take 1 tablet (100 mg total) by mouth 2 (two) times daily. 60 tablet 0    clonazePAM 0.5 MG Oral Tab Take 1 tablet (0.5 mg total) by mouth 2 (two) times daily as needed for Anxiety. 60 tablet 2    FLUoxetine 20 MG Oral Cap Take 1 capsule (20 mg total) by mouth daily. 90 capsule 3    Tretinoin 0.05 % External Cream Apply 1 Application topically nightly. 20 g 0    albuterol 108 (90 Base) MCG/ACT Inhalation Aero Soln Inhale 2 puffs into the lungs every 6 (six) hours as needed for Wheezing or Shortness of Breath. 1 each 1    SUMAtriptan Succinate 100 MG Oral Tab Take 1 tablet (100 mg total) by mouth every 2 (two) hours as needed for Migraine. REPEAT ONCE AFTER 2 HOURS ONLY 2 IN 24 HOURS MAX 9 tablet 5    ibuprofen 600 MG Oral Tab Take 1 tablet (600 mg total) by mouth every 8 (eight) hours as needed for Pain.        Past Medical History:   Diagnosis Date    Anxiety with depression     Blepharitis 2011    NG:  Blehpharitis, OU, 2011    Eyelashes turned in     NG: Aberant lashes, OU, 9/26/11; Management: Removed in office, OU, 2011    Infectious mononucleosis     Keratitis 2011    NG:  Keratitis, OU, 9/26/11    Migraines     with aura. Medication helps.    Ovarian cyst 2016    history of bilateral cysts that have ruptured in the past    Varicella  1998    NG: Varicella disease      Past Surgical History:   Procedure Laterality Date    UPPER GI ENDOSCOPY,BIOPSY  12/14/18= Normal.  Gastric/SB Bx normal             Family History   Problem Relation Age of Onset    Anemia Mother     Other (HCC) Mother     Other (lupus) Paternal Grandmother     Other (RA) Paternal Grandmother     Diabetes Neg         NG: No family history of diabetes     Heart Attack Neg         NG: No history of early cardiac disease/sudden death     Glaucoma Neg     Macular degeneration Neg     Heart Disorder Neg     Bleeding Disorders Neg     Clotting Disorder Neg     Cancer Neg      Patient Active Problem List   Diagnosis    Routine infant or child health check    Acne    Myopia with astigmatism    High serum cortisol    Anxiety and depression    Medication refill    Anemia    Annual physical exam    Syncope       REVIEW OF SYSTEMS:   A comprehensive 10 point review of systems was completed.  Pertinent positives and negatives noted in the the HPI            EXAM:   /82   Pulse 69   Ht 5' 4\" (1.626 m)   Wt 111 lb 3.2 oz (50.4 kg)   LMP 09/10/2023 (Approximate)   SpO2 98%   BMI 19.09 kg/m²   GENERAL: well developed, well nourished,in no apparent distress  SKIN: no rashes,no suspicious lesions  HEENT: right ear lobe piercing slightly stretched   LUNGS: clear to auscultation  CARDIO: RRR without murmur  Extremities : no wasting of the hands muscles   Negative phalen's and tinel's test   NEURO: no gross deficits              No orders of the defined types were placed in this encounter.    No results found.         ASSESSMENT AND PLAN:     1. Anemia, unspecified type  Cbc     - CBC With Differential With Platelet; Future  - Derm Referral - In Network  - Doxycycline Hyclate 100 MG Oral Tab; Take 1 tablet (100 mg total) by mouth daily.  Dispense: 90 tablet; Refill: 0  - clonazePAM 0.5 MG Oral Tab; Take 1 tablet (0.5 mg total) by mouth 2 (two) times daily as needed for Anxiety.  Dispense:  60 tablet; Refill: 2  - FLUoxetine 20 MG Oral Cap; Take 1 capsule (20 mg total) by mouth daily.  Dispense: 90 capsule; Refill: 3  - Meloxicam 15 MG Oral Tab; Take 1 tablet (15 mg total) by mouth daily for 7 days.  Dispense: 7 tablet; Refill: 0  - CBC With Differential With Platelet    2. Medication refill  Refilled prozac and clonazepam     - CBC With Differential With Platelet; Future  - Derm Referral - In Network  - Doxycycline Hyclate 100 MG Oral Tab; Take 1 tablet (100 mg total) by mouth daily.  Dispense: 90 tablet; Refill: 0  - clonazePAM 0.5 MG Oral Tab; Take 1 tablet (0.5 mg total) by mouth 2 (two) times daily as needed for Anxiety.  Dispense: 60 tablet; Refill: 2  - FLUoxetine 20 MG Oral Cap; Take 1 capsule (20 mg total) by mouth daily.  Dispense: 90 capsule; Refill: 3  - Meloxicam 15 MG Oral Tab; Take 1 tablet (15 mg total) by mouth daily for 7 days.  Dispense: 7 tablet; Refill: 0  - CBC With Differential With Platelet    3. Anxiety  Refilled prozac and clonazepam   - CBC With Differential With Platelet; Future  - Derm Referral - In Network  - Doxycycline Hyclate 100 MG Oral Tab; Take 1 tablet (100 mg total) by mouth daily.  Dispense: 90 tablet; Refill: 0  - clonazePAM 0.5 MG Oral Tab; Take 1 tablet (0.5 mg total) by mouth 2 (two) times daily as needed for Anxiety.  Dispense: 60 tablet; Refill: 2  - FLUoxetine 20 MG Oral Cap; Take 1 capsule (20 mg total) by mouth daily.  Dispense: 90 capsule; Refill: 3  - Meloxicam 15 MG Oral Tab; Take 1 tablet (15 mg total) by mouth daily for 7 days.  Dispense: 7 tablet; Refill: 0  - CBC With Differential With Platelet    4. Chronic migraine without aura without status migrainosus, not intractable  Follow up with the neurologist   - CBC With Differential With Platelet; Future  - Derm Referral - In Network  - Doxycycline Hyclate 100 MG Oral Tab; Take 1 tablet (100 mg total) by mouth daily.  Dispense: 90 tablet; Refill: 0  - clonazePAM 0.5 MG Oral Tab; Take 1 tablet (0.5 mg  total) by mouth 2 (two) times daily as needed for Anxiety.  Dispense: 60 tablet; Refill: 2  - FLUoxetine 20 MG Oral Cap; Take 1 capsule (20 mg total) by mouth daily.  Dispense: 90 capsule; Refill: 3  - Meloxicam 15 MG Oral Tab; Take 1 tablet (15 mg total) by mouth daily for 7 days.  Dispense: 7 tablet; Refill: 0  - CBC With Differential With Platelet    5. History of syncope  Reviewed cardiology note and holter monitor and discussed with the patient   - CBC With Differential With Platelet; Future  - Derm Referral - In Network  - Doxycycline Hyclate 100 MG Oral Tab; Take 1 tablet (100 mg total) by mouth daily.  Dispense: 90 tablet; Refill: 0  - clonazePAM 0.5 MG Oral Tab; Take 1 tablet (0.5 mg total) by mouth 2 (two) times daily as needed for Anxiety.  Dispense: 60 tablet; Refill: 2  - FLUoxetine 20 MG Oral Cap; Take 1 capsule (20 mg total) by mouth daily.  Dispense: 90 capsule; Refill: 3  - Meloxicam 15 MG Oral Tab; Take 1 tablet (15 mg total) by mouth daily for 7 days.  Dispense: 7 tablet; Refill: 0  - CBC With Differential With Platelet    6. Lesion of right earlobe  Referral to dermatology     - CBC With Differential With Platelet; Future  - Derm Referral - In Network  - Doxycycline Hyclate 100 MG Oral Tab; Take 1 tablet (100 mg total) by mouth daily.  Dispense: 90 tablet; Refill: 0  - clonazePAM 0.5 MG Oral Tab; Take 1 tablet (0.5 mg total) by mouth 2 (two) times daily as needed for Anxiety.  Dispense: 60 tablet; Refill: 2  - FLUoxetine 20 MG Oral Cap; Take 1 capsule (20 mg total) by mouth daily.  Dispense: 90 capsule; Refill: 3  - Meloxicam 15 MG Oral Tab; Take 1 tablet (15 mg total) by mouth daily for 7 days.  Dispense: 7 tablet; Refill: 0  - CBC With Differential With Platelet       7- Carpal tunnel syndrome: advised to use mobic  Advised to wear wrist splints     Please return to the clinic if you are having persistent symptoms. If worsening symptoms should go to the ER    Deejay Narayan MD,   Diplomate of  the American Board of Internal Medicine  Diplomate of the American Board of Obesity Medicine

## 2024-01-25 DIAGNOSIS — D64.9 ANEMIA, UNSPECIFIED TYPE: Primary | ICD-10-CM

## 2024-04-04 ENCOUNTER — HOSPITAL ENCOUNTER (OUTPATIENT)
Age: 29
Discharge: HOME | End: 2024-04-04
Attending: EMERGENCY MEDICINE
Payer: COMMERCIAL

## 2024-04-04 ENCOUNTER — APPOINTMENT (OUTPATIENT)
Dept: GENERAL RADIOLOGY | Age: 29
End: 2024-04-04
Attending: EMERGENCY MEDICINE
Payer: COMMERCIAL

## 2024-04-04 VITALS
HEART RATE: 65 BPM | SYSTOLIC BLOOD PRESSURE: 101 MMHG | OXYGEN SATURATION: 100 % | TEMPERATURE: 98 F | RESPIRATION RATE: 16 BRPM | DIASTOLIC BLOOD PRESSURE: 76 MMHG

## 2024-04-04 DIAGNOSIS — J98.01 ACUTE BRONCHOSPASM: ICD-10-CM

## 2024-04-04 DIAGNOSIS — R05.8 PRODUCTIVE COUGH: Primary | ICD-10-CM

## 2024-04-04 PROCEDURE — 71046 X-RAY EXAM CHEST 2 VIEWS: CPT | Performed by: EMERGENCY MEDICINE

## 2024-04-04 PROCEDURE — 99214 OFFICE O/P EST MOD 30 MIN: CPT

## 2024-04-04 PROCEDURE — 99213 OFFICE O/P EST LOW 20 MIN: CPT

## 2024-04-04 RX ORDER — ALBUTEROL SULFATE 90 UG/1
2 AEROSOL, METERED RESPIRATORY (INHALATION) EVERY 4 HOURS PRN
Qty: 1 EACH | Refills: 0 | Status: SHIPPED | OUTPATIENT
Start: 2024-04-04 | End: 2024-05-04

## 2024-04-04 RX ORDER — AMOXICILLIN AND CLAVULANATE POTASSIUM 875; 125 MG/1; MG/1
1 TABLET, FILM COATED ORAL 2 TIMES DAILY
Qty: 14 TABLET | Refills: 0 | Status: SHIPPED | OUTPATIENT
Start: 2024-04-04 | End: 2024-04-11

## 2024-04-04 NOTE — ED INITIAL ASSESSMENT (HPI)
Patient reports being ill x 1 week.  Negative Covid tests x 3 last week.  Reports continued persistent cough.  Reports hearing rattling in her chest at times.  Last fever was on Friday.  + headache.  Reports hx of pneumonia in the past.

## 2024-04-04 NOTE — ED PROVIDER NOTES
Patient Seen in: [facility]      History     Chief Complaint   Patient presents with    Cough/URI     Stated Complaint: Flu    Subjective:   HPI    Patient is a 28-year-old female with past history of depression who presents now with persistent cough.  Patient states she began experiencing sore throat, body aches, fatigue approximately 9 days ago.  The symptoms worsened over a few days and peaked about 1 week ago.  Since that time, the patient has felt gradually better.  The patient is taken multiple home COVID test which were negative.  Patient states the majority of her symptoms including sore throat and bodyaches have resolved.  However, the patient is at persistent productive cough.  The patient states cough is productive of some sputum.  Patient denies any chest pain.  Patient is not on birth control.  The patient denies any family history of clotting disorders.    Objective:   Past Medical History:   Diagnosis Date    Anxiety with depression     Blepharitis 2011    NG:  Blehpharitis, OU, 2011    Eyelashes turned in     NG: Aberant lashes, OU, 9/26/11; Management: Removed in office, OU, 2011    Infectious mononucleosis     Keratitis 2011    NG:  Keratitis, OU, 9/26/11    Migraines     with aura. Medication helps.    Ovarian cyst 2016    history of bilateral cysts that have ruptured in the past    Varicella 1998    NG: Varicella disease               Past Surgical History:   Procedure Laterality Date    UPPER GI ENDOSCOPY,BIOPSY  12/14/18= Normal.  Gastric/SB Bx normal                No pertinent social history.            Review of Systems    Positive for stated complaint: Flu  Other systems are as noted in HPI.  Constitutional and vital signs reviewed.      All other systems reviewed and negative except as noted above.    Physical Exam     ED Triage Vitals [04/04/24 1329]   /76   Pulse 65   Resp 16   Temp 97.7 °F (36.5 °C)   Temp src Temporal   SpO2 100 %   O2 Device None (Room air)        Current:/76   Pulse 65   Temp 97.7 °F (36.5 °C) (Temporal)   Resp 16   LMP 09/10/2023 (Approximate)   SpO2 100%         Physical Exam    Constitutional: Well-developed well-nourished in no acute distress  Head: Normocephalic, no swelling or tenderness  Eyes: Nonicteric sclera, no conjunctival injection  ENT: TMs are clear and flat bilaterally.  There is no posterior pharyngeal erythema  Chest: Trace scattered wheezing, no tenderness  Cardiovascular: Regular rate and rhythm without murmur  Abdomen: Soft, nontender and nondistended  Neurologic: Patient is awake, alert and oriented ×3.  The patient's motor strength is 5 out of 5 and symmetric in the upper and lower extremities bilaterally  Extremities: No focal swelling or tenderness  Skin: No pallor, no redness or warmth to the touch      ED Course   Labs Reviewed - No data to display          Pulse ox is 100% on room air, normal.  Vital signs are stable  PERC score is 0    Patient's chest x-ray was independently reviewed by myself.  There is no acute intrathoracic process noted.    Patient's x-ray report was reviewed with her.  The patient has had worsening productive cough for approximately 12 to 14 days.  Risk and benefits of antibiotics were discussed.  The patient prefers treatment.  Patient also has mild scattered wheezing.  Will provide with albuterol inhaler as well.       MDM      Viral syndrome versus pneumonia                                   Medical Decision Making      Disposition and Plan     Clinical Impression:  1. Productive cough    2. Acute bronchospasm         Disposition:  Discharge  4/4/2024  1:53 pm    Follow-up:  Deejay Narayan MD  [address] [place] [address] [id number] [phone]    In 3 days  If symptoms worsen          Medications Prescribed:  Current Discharge Medication List        START taking these medications    Details   amoxicillin clavulanate 875-125 MG Oral Tab Take 1 tablet by mouth 2 (two) times daily for 7  days.  Qty: 14 tablet, Refills: 0      !! albuterol 108 (90 Base) MCG/ACT Inhalation Aero Soln Inhale 2 puffs into the lungs every 4 (four) hours as needed for Wheezing.  Qty: 1 each, Refills: 0       !! - Potential duplicate medications found. Please discuss with provider.

## 2024-04-30 NOTE — TELEPHONE ENCOUNTER
MEDICATION REFILL REQUEST:    Future Appointment: 09/25/2024     Last appointment: 01/23/2024    Medication requested:   Requested Prescriptions     Pending Prescriptions Disp Refills    DOXYCYCLINE HYCLATE 100 MG Oral Tab [Pharmacy Med Name: Doxycycline Hyclate 100 MG Oral Tablet] 90 tablet 0     Sig: Take 1 tablet by mouth once daily         Last refill date:   Doxycycline Hyclate 100 MG Oral Tab 60 tablet 0 1/9/2024

## 2024-05-01 RX ORDER — DOXYCYCLINE HYCLATE 100 MG
100 TABLET ORAL DAILY
Qty: 90 TABLET | Refills: 0 | Status: SHIPPED | OUTPATIENT
Start: 2024-05-01

## 2024-08-13 DIAGNOSIS — D64.9 ANEMIA, UNSPECIFIED TYPE: ICD-10-CM

## 2024-08-13 DIAGNOSIS — Z76.0 MEDICATION REFILL: ICD-10-CM

## 2024-08-13 DIAGNOSIS — Z87.898 HISTORY OF SYNCOPE: ICD-10-CM

## 2024-08-13 DIAGNOSIS — H61.91 LESION OF RIGHT EARLOBE: ICD-10-CM

## 2024-08-13 DIAGNOSIS — F41.9 ANXIETY: ICD-10-CM

## 2024-08-13 DIAGNOSIS — G43.709 CHRONIC MIGRAINE WITHOUT AURA WITHOUT STATUS MIGRAINOSUS, NOT INTRACTABLE: ICD-10-CM

## 2024-08-13 RX ORDER — CLONAZEPAM 0.5 MG/1
0.5 TABLET ORAL 2 TIMES DAILY PRN
Qty: 60 TABLET | Refills: 0 | Status: SHIPPED | OUTPATIENT
Start: 2024-08-13

## 2024-08-13 NOTE — TELEPHONE ENCOUNTER
Future Appt. 09/25/2024    Last Visit: 01/23/2024    Medication Requested:  Requested Prescriptions     Pending Prescriptions Disp Refills    CLONAZEPAM 0.5 MG Oral Tab [Pharmacy Med Name: clonazePAM 0.5 MG Oral Tablet] 60 tablet 0     Sig: Take 1 tablet by mouth twice daily as needed for anxiety        Last refill:        Disp Refills Start End     clonazePAM 0.5 MG Oral Tab 60 tablet 2 1/23/2024 --    Sig - Route: Take 1 tablet (0.5 mg total) by mouth 2 (two) times daily as needed for Anxiety. - Oral      Controlled Substance Medication [id number]/2024 10:24 AM    This medication is a controlled substance - forward to provider to refill

## 2024-08-14 RX ORDER — DOXYCYCLINE HYCLATE 100 MG
100 TABLET ORAL DAILY
Qty: 90 TABLET | Refills: 0 | Status: SHIPPED | OUTPATIENT
Start: 2024-08-14

## 2024-08-14 NOTE — TELEPHONE ENCOUNTER
A refill request was received for:  Requested Prescriptions     Pending Prescriptions Disp Refills    DOXYCYCLINE HYCLATE 100 MG Oral Tab [Pharmacy Med Name: Doxycycline Hyclate 100 MG Oral Tablet] 90 tablet 0     Sig: Take 1 tablet by mouth once daily     Last refill date:  5/1/24    Last office visit: 1/23/24      Future Appointments   Date Time Provider Department Center   9/25/2024  8:40 AM Deejay Narayan MD [facility] [address]

## 2024-09-25 ENCOUNTER — TELEPHONE (OUTPATIENT)
Dept: INTERNAL MEDICINE CLINIC | Facility: CLINIC | Age: 29
End: 2024-09-25

## 2024-09-25 NOTE — TELEPHONE ENCOUNTER
[facility] called.  They need clarification on the Tretinoin medication that was sent to be refilled.    They need to know the grams and where to put the medication.

## 2024-09-26 NOTE — TELEPHONE ENCOUNTER
Returning call from [facility] pharmacy but the pharmacist stated she no longer needs clarification and the tretinoin is ready for pick-up.

## 2024-09-26 NOTE — TELEPHONE ENCOUNTER
Left a message on Lluvia's voice mail  to apply 0.5 g of cream on her face nightly.    Subjective:      Dillan Burton is a 33 year old  female at 38w4d who presents to [facility] with Uterine contractions. Patient reports pain with contractions.      Active Hospital Problems    Diagnosis    • Prenatal care, subsequent pregnancy in third trimester    • History of  labor      7 lb 12 oz at 36w3d.    Plan Cx length at [facility]  Fetal growth US in 3rd trimester          REVIEW OF SYSTEMS:  Constitutional: Negative for fever and chills.   Skin: Negative for rash.   HEENT: Negative for eye drainage, ear pain, sore throat.  Respiratory: Negative cough, wheezing or shortness of breath.    Cardiovascular: Negative for chest pain or chest pressure.   Gastrointestinal: negative for pain, diarrhea, constipation, nausea, vomiting  Genitourinary: negative for dysuria, hematuria, frquency  Extremities:  Negative for joint swelling or joint pain.  Endocrine: Negative for heat or cold intolerance.  Psych: Negative for change in mood or mentation.    Past Medical History:   Diagnosis Date   • Anxiety    • History of seizures as a child     Around age 2, none since. Thought likely due to fever at the time     Past Surgical History:   Procedure Laterality Date   • Ralph tooth extraction       SOCIAL HISTORY:   Social History     Tobacco Use   • Smoking status: Former Smoker     Quit date: 2015     Years since quittin.0   • Smokeless tobacco: Never Used   Substance Use Topics   • Alcohol use: No       Sexually Active: Yes             Partners with: Male       Birth Control/Protection: None      Drug Use:    No              Review of patient's social economics indicates:   Speech Therapist                 Comment: Speech language pathologist    Family History   Problem Relation Age of Onset   • Hyperlipidemia Mother    • Hypertension Mother    • Hypertension Father    • Hyperlipidemia Father    • Diabetes Father         Type 2   • Cancer, Breast Maternal Grandmother    • Cancer  Maternal Grandfather         lymphoma   • Dialysis/ESRD Paternal Grandfather    • Stroke Paternal Grandfather        No results found for this or any previous visit.      GBS:   CULTURE   Date Value Ref Range Status   10/30/2019   Final    NEGATIVE FOR STREPTOCOCCUS AGALACTIAE (STREP GROUP B)       Blood type A Rh Positive  TREPONEMA PALLIDUM AB (no units)   Date Value   2019 NONREACTIVE     Rubella Antibody, IgG (Units/mL)   Date Value   2021 43.6     HEP B Surface AG (no units)   Date Value   2019 NEGATIVE       No results found     Objective:      PHYSICAL EXAM:  Vitals:    22 1552   BP: 132/71   Pulse: 86   Resp: 16   Temp: 98.4 °F (36.9 °C)     GENERAL: Well developed, well nourished, in no acute distress  HEENT: WIthin normal limits  LUNGS:  Normal respiratory effort.  CTA.  No wheezes, rales or rhonchi bilaterally  HEART:  Regular rate, rhythm, S1, S2 normal, no murmur, rub or gallop   ABDOMEN:  soft and non-tender, normal bowel sounds  FHT: category 1  TOCO:  Just put on monitor  FETAL PRESENTATION:  cephalic   PELVIC:   External genitalia: normal appearance, no lesions or discharge    Vagina: normal appearing without lesions or discharge    Cervix: Dilation:   5cm      Effacement:   90%      Station:   0      Consistency: soft      Position: middle    Uterus:  Gravid, non tender  EXTREMITIES:  RLE:  Normal strength and tone, non-tender, Negative Elmo's sign, trace edema            LLE:  Normal strength and tone, non-tender, Negative Elmo's sign, trace edema      Assessment:   33 year old  at 38w4d presents to [facility] for uterine contractions   Additional diagnoses:   Active Hospital Problems    Diagnosis    • Prenatal care, subsequent pregnancy in third trimester    • History of  labor      7 lb 12 oz at 36w3d.    Plan Cx length at [facility]  Fetal growth US in 3rd trimester          Plan:      Admit to Labor and Delivery Observation  status  - observe and recheck cervix. Suspect labor   Is the patient expected to require at least a two midnight stay in the hospital? Yes -  I certify that I expect inpatient services for greater than two midnights are medically necessary for this patient.  Please see H&P and MD Progress Notes for additional information about the patient's course of treatment.

## 2024-10-03 ENCOUNTER — OFFICE VISIT (OUTPATIENT)
Dept: DERMATOLOGY CLINIC | Facility: CLINIC | Age: 29
End: 2024-10-03

## 2024-10-03 ENCOUNTER — TELEPHONE (OUTPATIENT)
Dept: DERMATOLOGY CLINIC | Facility: CLINIC | Age: 29
End: 2024-10-03

## 2024-10-03 DIAGNOSIS — L70.0 ACNE VULGARIS: Primary | ICD-10-CM

## 2024-10-03 PROCEDURE — 99203 OFFICE O/P NEW LOW 30 MIN: CPT | Performed by: PHYSICIAN ASSISTANT

## 2024-10-03 RX ORDER — CLASCOTERONE 1 G/100G
1 CREAM TOPICAL DAILY
Qty: 60 G | Refills: 1 | Status: SHIPPED | OUTPATIENT
Start: 2024-10-03

## 2024-10-03 NOTE — PROGRESS NOTES
HPI:    Patient ID: Lluvia Duarte is a 28 year old female.    Patient presents for acne on the face for the past several years. Currently on proactive, and doxycycline and Tretinoin cream not much improvement. States tretinoin is drying her out. She does use moisturizer with the tretinoin. Denies any personal or family hx of skin cancer. She states Acne is causing her pain. Stays the same during her cycle. No draining or tenderness noted. Nothing on chest or back. May have used clindamycin the past.       Review of Systems   Constitutional:  Negative for chills and fever.   Musculoskeletal:  Negative for arthralgias and myalgias.   Skin:  Positive for rash. Negative for color change and wound.            Current Outpatient Medications   Medication Sig Dispense Refill    tretinoin 0.025 % External Cream Apply 1 Application topically nightly. 20 g 0    clonazePAM 0.5 MG Oral Tab Take 1 tablet (0.5 mg total) by mouth 2 (two) times daily as needed for Anxiety. 60 tablet 2    DOXYCYCLINE HYCLATE 100 MG Oral Tab Take 1 tablet by mouth once daily 90 tablet 0    FLUoxetine 20 MG Oral Cap Take 1 capsule (20 mg total) by mouth daily. 90 capsule 3    Tretinoin 0.05 % External Cream Apply 1 Application topically nightly. 20 g 0    albuterol 108 (90 Base) MCG/ACT Inhalation Aero Soln Inhale 2 puffs into the lungs every 6 (six) hours as needed for Wheezing or Shortness of Breath. 1 each 1    SUMAtriptan Succinate 100 MG Oral Tab Take 1 tablet (100 mg total) by mouth every 2 (two) hours as needed for Migraine. REPEAT ONCE AFTER 2 HOURS ONLY 2 IN 24 HOURS MAX 9 tablet 5    ibuprofen 600 MG Oral Tab Take 1 tablet (600 mg total) by mouth every 8 (eight) hours as needed for Pain.      CLONAZEPAM 0.5 MG Oral Tab Take 1 tablet by mouth twice daily as needed for anxiety (Patient not taking: Reported on 9/25/2024) 60 tablet 0     Allergies:No Known Allergies   LMP 09/13/2024   There is no height or weight on file to calculate  BMI.  PHYSICAL EXAM:   Physical Exam  Constitutional:       General: She is not in acute distress.     Appearance: Normal appearance.   Skin:     General: Skin is warm and dry.      Findings: Rash present.      Comments: Papules noted on the face. No draining or tnederness noted. No scaling noted. No pustules noted. No cystic lesions ntoed.    Neurological:      Mental Status: She is alert and oriented to person, place, and time.                ASSESSMENT/PLAN:   1. Acne vulgaris  -After discussion with patient, advised the following:  -Started winlevi  -Educated to apply daily   -Continue with tretinoin  -Return in 6-8 weeks if no improving.   -To call or follow-up with worsening symptoms or concerns.   -Pt was agreeable to plan and will comply with discussion above.         No orders of the defined types were placed in this encounter.      Meds This Visit:  Requested Prescriptions      No prescriptions requested or ordered in this encounter       Imaging & Referrals:  None         ID#[id number]

## 2024-10-04 NOTE — TELEPHONE ENCOUNTER
Medication PA Requested:     Clascoterone (WINLEVI) 1 % External Cream                                                      CoverMyMeds Used:  Key:  Quantity: 60 g  Day Supply:  Sig: Apply 1 application topically daily  DX Code:   L70.0                                  CPT code (if applicable):   Case Number/Pending Ref#:     Thank you!

## 2024-12-22 DIAGNOSIS — F41.9 ANXIETY: ICD-10-CM

## 2024-12-22 DIAGNOSIS — F32.A ANXIETY AND DEPRESSION: ICD-10-CM

## 2024-12-22 DIAGNOSIS — F41.9 ANXIETY AND DEPRESSION: ICD-10-CM

## 2024-12-22 DIAGNOSIS — L70.9 ACNE, UNSPECIFIED ACNE TYPE: ICD-10-CM

## 2024-12-22 DIAGNOSIS — Z76.0 MEDICATION REFILL: ICD-10-CM

## 2024-12-25 RX ORDER — TRETINOIN 0.25 MG/G
1 CREAM TOPICAL NIGHTLY
Qty: 20 G | Refills: 0 | Status: SHIPPED | OUTPATIENT
Start: 2024-12-25

## 2024-12-26 ENCOUNTER — TELEPHONE (OUTPATIENT)
Dept: INTERNAL MEDICINE CLINIC | Facility: CLINIC | Age: 29
End: 2024-12-26

## 2024-12-26 NOTE — TELEPHONE ENCOUNTER
[facility] called regarding the following prescription:    tretinoin 0.025 % External Cream     Patient's insurance requires more information regarding where and how much topical cream should be applied.    Please call [facility] to give more details on this prescription:    [facility] [phone], [phone]     KG

## 2025-01-06 DIAGNOSIS — F41.9 ANXIETY AND DEPRESSION: ICD-10-CM

## 2025-01-06 DIAGNOSIS — L70.9 ACNE, UNSPECIFIED ACNE TYPE: ICD-10-CM

## 2025-01-06 DIAGNOSIS — Z76.0 MEDICATION REFILL: ICD-10-CM

## 2025-01-06 DIAGNOSIS — F32.A ANXIETY AND DEPRESSION: ICD-10-CM

## 2025-01-06 DIAGNOSIS — F41.9 ANXIETY: ICD-10-CM

## 2025-01-06 NOTE — TELEPHONE ENCOUNTER
Future Appt. 02/04/2025    Last Visit: 09/25/2024    Medication Requested:  Requested Prescriptions     Pending Prescriptions Disp Refills    clonazePAM 0.5 MG Oral Tab 60 tablet 2     Sig: Take 1 tablet (0.5 mg total) by mouth 2 (two) times daily as needed for Anxiety.        Last refill:        Disp Refills Start End     clonazePAM 0.5 MG Oral Tab 60 tablet 2 9/25/2024 --    Sig - Route: Take 1 tablet (0.5 mg total) by mouth 2 (two) times daily as needed for Anxiety. - Oral        Controlled Substance Medication Kuffgh6401/06/2025 10:53 AM    This medication is a controlled substance - forward to provider to refill

## 2025-01-07 RX ORDER — CLONAZEPAM 0.5 MG/1
0.5 TABLET ORAL 2 TIMES DAILY PRN
Qty: 60 TABLET | Refills: 0 | Status: SHIPPED | OUTPATIENT
Start: 2025-01-07

## 2025-01-07 RX ORDER — CLONAZEPAM 0.5 MG/1
0.5 TABLET ORAL 2 TIMES DAILY PRN
Qty: 60 TABLET | Refills: 2 | OUTPATIENT
Start: 2025-01-07

## 2025-01-14 ENCOUNTER — TELEPHONE (OUTPATIENT)
Dept: INTERNAL MEDICINE CLINIC | Facility: CLINIC | Age: 30
End: 2025-01-14

## 2025-01-14 DIAGNOSIS — S22.49XA CLOSED FRACTURE OF MULTIPLE RIBS, UNSPECIFIED LATERALITY, INITIAL ENCOUNTER: Primary | ICD-10-CM

## 2025-01-14 RX ORDER — HYDROCODONE BITARTRATE AND ACETAMINOPHEN 5; 325 MG/1; MG/1
1 TABLET ORAL EVERY 8 HOURS PRN
Qty: 15 TABLET | Refills: 0 | Status: SHIPPED | OUTPATIENT
Start: 2025-01-14

## 2025-01-14 NOTE — TELEPHONE ENCOUNTER
Patient called the office.  She has 3 broken ribs.  Went to [facility].    Is still in pain and asking for a call back from the nurse.    She was told to schedule an appointment with her PCP.  Dr. Narayan has no open appointments.

## 2025-01-14 NOTE — TELEPHONE ENCOUNTER
Spoke with Lluvia states had fallen out of friend's truck and  3 fractured ribs. Patient received prescription norco 5mg-325 mg 12 tablets. Patient rated 6-7/10 chest pain. Patient denied shortness of breath. Patient instructed by the ED to call PCP office within 2 days.      Patient prefers 24 hour pharmacy [facility]  [phone].       Does Dr. Narayan require follow up appointment for prescription?

## 2025-01-15 ENCOUNTER — TELEPHONE (OUTPATIENT)
Dept: INTERNAL MEDICINE CLINIC | Facility: CLINIC | Age: 30
End: 2025-01-15

## 2025-01-15 NOTE — TELEPHONE ENCOUNTER
[employer] message sent to pt to inform her that Dr Narayan has sent a Norco Rx appt for #15 tablets for pain control until pt's scheduled appt w/ Dr Narayan on 2/4/25.

## 2025-01-15 NOTE — TELEPHONE ENCOUNTER
Pharmacy called about drug interaction between CLONAZEPAM 0.5 MG Oral Tab and HYDROcodone-acetaminophen (NORCO) 5-325 MG Oral Tab.     Call back number [phone].

## 2025-01-16 ENCOUNTER — TELEPHONE (OUTPATIENT)
Dept: INTERNAL MEDICINE CLINIC | Facility: CLINIC | Age: 30
End: 2025-01-16

## 2025-01-16 RX ORDER — IBUPROFEN 600 MG/1
600 TABLET, FILM COATED ORAL EVERY 6 HOURS PRN
Qty: 30 TABLET | Refills: 0 | OUTPATIENT
Start: 2025-01-16 | End: 2025-02-15

## 2025-01-16 RX ORDER — IBUPROFEN 600 MG/1
600 TABLET, FILM COATED ORAL EVERY 6 HOURS PRN
Status: CANCELLED | OUTPATIENT
Start: 2025-01-16

## 2025-01-16 NOTE — TELEPHONE ENCOUNTER
Spoke with Pedro who called back and requesting prescription for Ibuprofen 600 mg that was ordered in the emergency department.     Ibuprofen 600 mg pended;authorize if appropriate.

## 2025-01-16 NOTE — TELEPHONE ENCOUNTER
[facility] called the office.  The pharmacist said there is a drug interaction regarding the Norco medication that was prescribed.

## 2025-01-16 NOTE — TELEPHONE ENCOUNTER
Spoke with  [facility] Pharmacist and informed him to dispense norco since we will call the patient to hold clonazepam. Raymond agreed.

## 2025-01-16 NOTE — TELEPHONE ENCOUNTER
Spoke with Lluvia informed Dr. Narayan approved taking norco but she must hold clonzepam while taking this medication. Lucian voiced understanding.

## 2025-01-27 ENCOUNTER — TELEPHONE (OUTPATIENT)
Dept: INTERNAL MEDICINE CLINIC | Facility: CLINIC | Age: 30
End: 2025-01-27

## 2025-01-27 NOTE — TELEPHONE ENCOUNTER
Attempted to contact patient.Unable to reach. Message left on voicemail to call and set up appointment.

## 2025-01-27 NOTE — TELEPHONE ENCOUNTER
Spoke with Lluvia states she out of medication and last Wednesday 1/22/25 has pain in front of rib cage.  Patient states has pain with movement and breathing.Patient does report has some front rib pain which was not present at time of this injury.     Patient scheduled Dr. Narayan's first available appointment on 1/29/25 12:20 PM.     RN advises to go to the emergency department for shortness of breath or severe chest pain. Patient voiced understanding.

## 2025-01-27 NOTE — TELEPHONE ENCOUNTER
Patient called the office.  She broke 3 ribs, 2 weeks ago.  And the pain was in the back of the rib area.    She said now she is experiencing pain in the front of her rib area.    She is asking for a call back from the nurse.

## 2025-01-29 ENCOUNTER — HOSPITAL ENCOUNTER (OUTPATIENT)
Dept: GENERAL RADIOLOGY | Age: 30
Discharge: HOME | End: 2025-01-29
Attending: INTERNAL MEDICINE
Payer: COMMERCIAL

## 2025-01-29 ENCOUNTER — LAB ENCOUNTER (OUTPATIENT)
Dept: LAB | Age: 30
End: 2025-01-29
Attending: INTERNAL MEDICINE
Payer: COMMERCIAL

## 2025-01-29 ENCOUNTER — OFFICE VISIT (OUTPATIENT)
Dept: INTERNAL MEDICINE CLINIC | Facility: CLINIC | Age: 30
End: 2025-01-29
Payer: COMMERCIAL

## 2025-01-29 VITALS
BODY MASS INDEX: 18.81 KG/M2 | DIASTOLIC BLOOD PRESSURE: 60 MMHG | OXYGEN SATURATION: 98 % | SYSTOLIC BLOOD PRESSURE: 92 MMHG | HEIGHT: 64 IN | HEART RATE: 78 BPM | WEIGHT: 110.19 LBS
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
## 2025-01-29 DIAGNOSIS — R10.11 RUQ PAIN: ICD-10-CM

## 2025-01-29 DIAGNOSIS — S22.41XA CLOSED FRACTURE OF MULTIPLE RIBS OF RIGHT SIDE, INITIAL ENCOUNTER: ICD-10-CM

## 2025-01-29 DIAGNOSIS — D64.9 ANEMIA, UNSPECIFIED TYPE: ICD-10-CM

## 2025-01-29 DIAGNOSIS — Z00.00 ROUTINE GENERAL MEDICAL EXAMINATION AT A HEALTH CARE FACILITY: ICD-10-CM

## 2025-01-29 DIAGNOSIS — R07.81 RIB PAIN: Primary | ICD-10-CM

## 2025-01-29 LAB
ALBUMIN SERPL-MCNC: 4.8 G/DL (ref 3.2–4.8)
ALBUMIN/GLOB SERPL: 1.6 {RATIO} (ref 1–2)
ALP LIVER SERPL-CCNC: 91 U/L
ALT SERPL-CCNC: 14 U/L
ANION GAP SERPL CALC-SCNC: 6 MMOL/L (ref 0–18)
AST SERPL-CCNC: 18 U/L (ref ?–34)
BASOPHILS # BLD AUTO: 0.08 X10(3) UL (ref 0–0.2)
BASOPHILS NFR BLD AUTO: 0.7 %
BILIRUB SERPL-MCNC: 0.6 MG/DL (ref 0.3–1.2)
BILIRUB UR QL: NEGATIVE
BUN BLD-MCNC: 9 MG/DL (ref 9–23)
BUN/CREAT SERPL: 11.5 (ref 10–20)
CALCIUM BLD-MCNC: 10 MG/DL (ref 8.7–10.4)
CHLORIDE SERPL-SCNC: 105 MMOL/L (ref 98–112)
CHOLEST SERPL-MCNC: 147 MG/DL (ref ?–200)
CO2 SERPL-SCNC: 26 MMOL/L (ref 21–32)
COLOR UR: YELLOW
CREAT BLD-MCNC: 0.78 MG/DL
DEPRECATED HBV CORE AB SER IA-ACNC: 31 NG/ML
DEPRECATED RDW RBC AUTO: 41.1 FL (ref 35.1–46.3)
EGFRCR SERPLBLD CKD-EPI 2021: 106 ML/MIN/1.73M2 (ref 60–?)
EOSINOPHIL # BLD AUTO: 0.1 X10(3) UL (ref 0–0.7)
EOSINOPHIL NFR BLD AUTO: 0.9 %
ERYTHROCYTE [DISTWIDTH] IN BLOOD BY AUTOMATED COUNT: 12.5 % (ref 11–15)
FASTING PATIENT LIPID ANSWER: YES
FASTING STATUS PATIENT QL REPORTED: YES
FOLATE SERPL-MCNC: 7 NG/ML (ref 5.4–?)
GLOBULIN PLAS-MCNC: 3 G/DL (ref 2–3.5)
GLUCOSE BLD-MCNC: 85 MG/DL (ref 70–99)
GLUCOSE UR-MCNC: NORMAL MG/DL
HCT VFR BLD AUTO: 35.3 %
HDLC SERPL-MCNC: 40 MG/DL (ref 40–59)
HGB BLD-MCNC: 12.3 G/DL
HGB UR QL STRIP.AUTO: NEGATIVE
IMM GRANULOCYTES # BLD AUTO: 0.04 X10(3) UL (ref 0–1)
IMM GRANULOCYTES NFR BLD: 0.4 %
IRON SATN MFR SERPL: 32 %
IRON SERPL-MCNC: 110 UG/DL
KETONES UR-MCNC: NEGATIVE MG/DL
LDLC SERPL CALC-MCNC: 80 MG/DL (ref ?–100)
LEUKOCYTE ESTERASE UR QL STRIP.AUTO: NEGATIVE
LYMPHOCYTES # BLD AUTO: 2.69 X10(3) UL (ref 1–4)
LYMPHOCYTES NFR BLD AUTO: 24 %
MCH RBC QN AUTO: 31.5 PG (ref 26–34)
MCHC RBC AUTO-ENTMCNC: 34.8 G/DL (ref 31–37)
MCV RBC AUTO: 90.5 FL
MONOCYTES # BLD AUTO: 0.67 X10(3) UL (ref 0.1–1)
MONOCYTES NFR BLD AUTO: 6 %
NEUTROPHILS # BLD AUTO: 7.62 X10 (3) UL (ref 1.5–7.7)
NEUTROPHILS # BLD AUTO: 7.62 X10(3) UL (ref 1.5–7.7)
NEUTROPHILS NFR BLD AUTO: 68 %
NITRITE UR QL STRIP.AUTO: NEGATIVE
NONHDLC SERPL-MCNC: 107 MG/DL (ref ?–130)
OSMOLALITY SERPL CALC.SUM OF ELEC: 282 MOSM/KG (ref 275–295)
PH UR: 7 [PH] (ref 5–8)
PLATELET # BLD AUTO: 406 10(3)UL (ref 150–450)
POTASSIUM SERPL-SCNC: 3.9 MMOL/L (ref 3.5–5.1)
PROT SERPL-MCNC: 7.8 G/DL (ref 5.7–8.2)
PROT UR-MCNC: NEGATIVE MG/DL
RBC # BLD AUTO: 3.9 X10(6)UL
SODIUM SERPL-SCNC: 137 MMOL/L (ref 136–145)
SP GR UR STRIP: 1.02 (ref 1–1.03)
TOTAL IRON BINDING CAPACITY: 346 UG/DL (ref 250–425)
TRANSFERRIN SERPL-MCNC: 274 MG/DL (ref 250–380)
TRIGL SERPL-MCNC: 158 MG/DL (ref 30–149)
TSI SER-ACNC: 0.81 UIU/ML (ref 0.55–4.78)
UROBILINOGEN UR STRIP-ACNC: 2
VIT B12 SERPL-MCNC: 606 PG/ML (ref 211–911)
VLDLC SERPL CALC-MCNC: 25 MG/DL (ref 0–30)
WBC # BLD AUTO: 11.2 X10(3) UL (ref 4–11)

## 2025-01-29 PROCEDURE — 3078F DIAST BP <80 MM HG: CPT | Performed by: INTERNAL MEDICINE

## 2025-01-29 PROCEDURE — 99214 OFFICE O/P EST MOD 30 MIN: CPT | Performed by: INTERNAL MEDICINE

## 2025-01-29 PROCEDURE — 71101 X-RAY EXAM UNILAT RIBS/CHEST: CPT | Performed by: INTERNAL MEDICINE

## 2025-01-29 PROCEDURE — 80061 LIPID PANEL: CPT | Performed by: INTERNAL MEDICINE

## 2025-01-29 PROCEDURE — 80050 GENERAL HEALTH PANEL: CPT | Performed by: INTERNAL MEDICINE

## 2025-01-29 PROCEDURE — 84466 ASSAY OF TRANSFERRIN: CPT | Performed by: INTERNAL MEDICINE

## 2025-01-29 PROCEDURE — 82728 ASSAY OF FERRITIN: CPT | Performed by: INTERNAL MEDICINE

## 2025-01-29 PROCEDURE — 83540 ASSAY OF IRON: CPT | Performed by: INTERNAL MEDICINE

## 2025-01-29 PROCEDURE — 81001 URINALYSIS AUTO W/SCOPE: CPT | Performed by: INTERNAL MEDICINE

## 2025-01-29 PROCEDURE — 3008F BODY MASS INDEX DOCD: CPT | Performed by: INTERNAL MEDICINE

## 2025-01-29 PROCEDURE — 82607 VITAMIN B-12: CPT | Performed by: INTERNAL MEDICINE

## 2025-01-29 PROCEDURE — 3074F SYST BP LT 130 MM HG: CPT | Performed by: INTERNAL MEDICINE

## 2025-01-29 PROCEDURE — 82746 ASSAY OF FOLIC ACID SERUM: CPT | Performed by: INTERNAL MEDICINE

## 2025-01-29 RX ORDER — IBUPROFEN 600 MG/1
600 TABLET, FILM COATED ORAL 2 TIMES DAILY PRN
Qty: 14 TABLET | Refills: 0 | Status: SHIPPED | OUTPATIENT
Start: 2025-01-29

## 2025-01-29 RX ORDER — LIDOCAINE 50 MG/G
1 PATCH TOPICAL EVERY 24 HOURS
COMMUNITY
Start: 2025-01-11

## 2025-01-29 RX ORDER — HYDROCODONE BITARTRATE AND ACETAMINOPHEN 5; 325 MG/1; MG/1
1 TABLET ORAL EVERY 6 HOURS PRN
Qty: 15 TABLET | Refills: 0 | Status: SHIPPED | OUTPATIENT
Start: 2025-01-29

## 2025-01-29 NOTE — PROGRESS NOTES
Lluvia Duarte is a 29 year old female.    Chief complaint: rib pain, follow up ER     HPI:     Lluvia Duarte is a 29 year old pleasant female who presents for ER follow up , rib pain     2 weeks ago   Was in her friend's truck   Got caught while she was trying to come off the truck   Fell on concrete   Hit her chest first   Landed on her back  Went to ER   Was found to have 3 ribs FX    Still having pain is 7/10   Last Wednesday   When she started having a new pain in the right rib area / RUQ   Gets worse at night   Gets worse with deep breath and coughing   She is taking norco for pain   Was taking ibuprofen and tylenol   Also using lidocaine patch       Scheduled for physical next week       Anemia             Current Outpatient Medications   Medication Sig Dispense Refill    lidocaine 5 % External Patch Place 1 patch onto the skin daily.      HYDROcodone-acetaminophen (NORCO) 5-325 MG Oral Tab Take 1 tablet by mouth every 8 (eight) hours as needed for Pain. 15 tablet 0    Naloxone HCl 4 MG/0.1ML Nasal Liquid 4 mg by Intranasal route as needed (May repeat as needed in other nostril if symptoms persist). If patient remains unresponsive, repeat dose in other nostril 2-5 minutes after first dose. 1 kit 0    CLONAZEPAM 0.5 MG Oral Tab Take 1 tablet by mouth twice daily as needed for anxiety 60 tablet 0    tretinoin 0.025 % External Cream Apply 1 Application topically nightly. 20 g 0    Clascoterone (WINLEVI) 1 % External Cream Apply 1 Application topically daily. 60 g 1    FLUoxetine 20 MG Oral Cap Take 1 capsule (20 mg total) by mouth daily. 90 capsule 3    Tretinoin 0.05 % External Cream Apply 1 Application topically nightly. 20 g 0    albuterol 108 (90 Base) MCG/ACT Inhalation Aero Soln Inhale 2 puffs into the lungs every 6 (six) hours as needed for Wheezing or Shortness of Breath. 1 each 1    ibuprofen 600 MG Oral Tab Take 1 tablet (600 mg total) by mouth every 8 (eight) hours as needed for Pain.       CLONAZEPAM 0.5 MG Oral Tab Take 1 tablet by mouth twice daily as needed for anxiety (Patient not taking: Reported on 1/29/2025) 60 tablet 0      Past Medical History:    Anxiety with depression    Blepharitis    NG:  Blehpharitis, OU, 2011    Eyelashes turned in    NG: Aberant lashes, OU, 9/26/11; Management: Removed in office, OU, 2011    Infectious mononucleosis    Keratitis    NG:  Keratitis, OU, 9/26/11    Migraines    with aura. Medication helps.    Ovarian cyst    history of bilateral cysts that have ruptured in the past    Varicella    NG: Varicella disease      Past Surgical History:   Procedure Laterality Date    Upper gi endoscopy,biopsy  12/14/18= Normal.  Gastric/SB Bx normal             Family History   Problem Relation Age of Onset    Anemia Mother     Other (HCC) Mother     Other (lupus) Paternal Grandmother     Other (RA) Paternal Grandmother     Diabetes Neg         NG: No family history of diabetes     Heart Attack Neg         NG: No history of early cardiac disease/sudden death     Glaucoma Neg     Macular degeneration Neg     Heart Disorder Neg     Bleeding Disorders Neg     Clotting Disorder Neg     Cancer Neg      Patient Active Problem List   Diagnosis    Routine infant or child health check    Acne    Myopia with astigmatism    High serum cortisol    Anxiety and depression    Medication refill    Anemia    Annual physical exam    Syncope    History of syncope    Lesion of right earlobe    Chronic migraine without aura without status migrainosus, not intractable    Anxiety       REVIEW OF SYSTEMS:   A comprehensive 10 point review of systems was completed.  Pertinent positives and negatives noted in the the HPI            EXAM:   BP 92/60   Pulse 78   Ht 5' 4\" (1.626 m)   Wt 110 lb 3.2 oz (50 kg)   LMP 09/13/2024   SpO2 98%   BMI 18.92 kg/m²   GENERAL: well developed, well nourished,in no apparent distress    LUNGS: clear to auscultation  CARDIO: RRR without murmur  Tenderness to  palpation over right midaxillary rib area   GI: guarding when pressing close to the ribs              Orders Placed This Encounter    lidocaine 5 % External Patch     Sig: Place 1 patch onto the skin daily.     No results found.         ASSESSMENT AND PLAN:       ICD-10-CM    1. Rib pain  R07.81 lidocaine 5 % External Patch     XR RIBS WITH CHEST (3 VIEWS), RIGHT  (CPT=71101)      2. Closed fracture of multiple ribs of right side, initial encounter  S22.41XA lidocaine 5 % External Patch     XR RIBS WITH CHEST (3 VIEWS), RIGHT  (CPT=71101)      3. Fall, initial encounter  W19.XXXA lidocaine 5 % External Patch     XR RIBS WITH CHEST (3 VIEWS), RIGHT  (CPT=71101)      4. RUQ pain  R10.11 lidocaine 5 % External Patch     XR RIBS WITH CHEST (3 VIEWS), RIGHT  (CPT=71101)      5. Routine general medical examination at a health care facility  Z00.00 CBC With Differential With Platelet     Comp Metabolic Panel (14)     Lipid Panel     Ferritin     Iron And Tibc     Vitamin B12     TSH W Reflex To Free T4     Folic Acid Serum (Folate)     Urinalysis with Culture Reflex [E]     HYDROcodone-acetaminophen (NORCO) 5-325 MG Oral Tab     ibuprofen 600 MG Oral Tab      6. Anemia, unspecified type  D64.9 CBC With Differential With Platelet     Ferritin     Iron And Tibc     Vitamin B12     Folic Acid Serum (Folate)         Norco only for severe pain   Tylenol is preferred  can take ibuprofen as needed   Advised to complete blood work   CXR and rib   If pain is unexplained consider CT scan   Discussed alarming signs if any to go to the ER       Please return to the clinic if you are having persistent symptoms. If worsening symptoms should go to the ER    Deejay Narayan MD,   Diplomate of the American Board of Internal Medicine  Diplomate of the American Board of Obesity Medicine

## 2025-01-31 DIAGNOSIS — S22.49XA CLOSED FRACTURE OF MULTIPLE RIBS, UNSPECIFIED LATERALITY, INITIAL ENCOUNTER: ICD-10-CM
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
## 2025-01-31 DIAGNOSIS — E61.1 IRON DEFICIENCY: Primary | ICD-10-CM

## 2025-01-31 DIAGNOSIS — R10.11 RUQ PAIN: ICD-10-CM

## 2025-01-31 RX ORDER — FERROUS SULFATE 324(65)MG
1 TABLET, DELAYED RELEASE (ENTERIC COATED) ORAL 2 TIMES DAILY
Qty: 180 TABLET | Refills: 1 | Status: SHIPPED | OUTPATIENT
Start: 2025-01-31 | End: 2025-05-01

## 2025-02-04 ENCOUNTER — OFFICE VISIT (OUTPATIENT)
Dept: INTERNAL MEDICINE CLINIC | Facility: CLINIC | Age: 30
End: 2025-02-04
Payer: COMMERCIAL

## 2025-02-04 VITALS
BODY MASS INDEX: 18.95 KG/M2 | HEART RATE: 77 BPM | HEIGHT: 64 IN | WEIGHT: 111 LBS | SYSTOLIC BLOOD PRESSURE: 100 MMHG | DIASTOLIC BLOOD PRESSURE: 62 MMHG | OXYGEN SATURATION: 100 %

## 2025-02-04 DIAGNOSIS — S22.41XA CLOSED FRACTURE OF MULTIPLE RIBS OF RIGHT SIDE, INITIAL ENCOUNTER: ICD-10-CM

## 2025-02-04 DIAGNOSIS — F41.9 ANXIETY: ICD-10-CM

## 2025-02-04 DIAGNOSIS — Z00.00 ANNUAL PHYSICAL EXAM: Primary | ICD-10-CM

## 2025-02-04 DIAGNOSIS — Z76.0 MEDICATION REFILL: ICD-10-CM

## 2025-02-04 DIAGNOSIS — E61.1 IRON DEFICIENCY: ICD-10-CM

## 2025-02-04 DIAGNOSIS — G43.709 CHRONIC MIGRAINE WITHOUT AURA WITHOUT STATUS MIGRAINOSUS, NOT INTRACTABLE: ICD-10-CM

## 2025-02-04 DIAGNOSIS — Z12.4 SCREENING FOR CERVICAL CANCER: ICD-10-CM

## 2025-02-04 DIAGNOSIS — L70.9 ACNE, UNSPECIFIED ACNE TYPE: ICD-10-CM

## 2025-02-04 PROCEDURE — 3008F BODY MASS INDEX DOCD: CPT | Performed by: INTERNAL MEDICINE

## 2025-02-04 PROCEDURE — 87591 N.GONORRHOEAE DNA AMP PROB: CPT | Performed by: INTERNAL MEDICINE

## 2025-02-04 PROCEDURE — 88175 CYTOPATH C/V AUTO FLUID REDO: CPT | Performed by: INTERNAL MEDICINE

## 2025-02-04 PROCEDURE — 99395 PREV VISIT EST AGE 18-39: CPT | Performed by: INTERNAL MEDICINE

## 2025-02-04 PROCEDURE — 3078F DIAST BP <80 MM HG: CPT | Performed by: INTERNAL MEDICINE

## 2025-02-04 PROCEDURE — 3074F SYST BP LT 130 MM HG: CPT | Performed by: INTERNAL MEDICINE

## 2025-02-04 PROCEDURE — 87491 CHLMYD TRACH DNA AMP PROBE: CPT | Performed by: INTERNAL MEDICINE

## 2025-02-04 RX ORDER — LIDOCAINE 50 MG/G
1 PATCH TOPICAL
Qty: 14 PATCH | Refills: 0 | Status: SHIPPED | OUTPATIENT
Start: 2025-02-04

## 2025-02-04 RX ORDER — CLONAZEPAM 0.5 MG/1
0.5 TABLET ORAL 2 TIMES DAILY PRN
Qty: 60 TABLET | Refills: 2 | Status: SHIPPED | OUTPATIENT
Start: 2025-02-04

## 2025-02-04 RX ORDER — FLUCONAZOLE 150 MG/1
150 TABLET ORAL
Qty: 2 TABLET | Refills: 0 | Status: SHIPPED | OUTPATIENT
Start: 2025-02-04

## 2025-02-04 NOTE — PROGRESS NOTES
Lluvia Duarte is a 29 year old female.    Chief complaint: annual physical exam       HPI:     Lluvia Duarte is a 29 year old pleasant female who presents for annual physical exam      S/p fall   Complicated by multiple rib fractures   Feeling better compared to last week  No sob  No diarrhea or constipation   No fever or chills   No urinary complaints        Iron def   Did see GI in the past   Period is 3 days     No smoking never been a smoker   Alcohol : no   Exercise : no   Family history of cancer : no     Current Outpatient Medications   Medication Sig Dispense Refill    Ferrous Sulfate 324 (65 Fe) MG Oral Tab EC Take 1 tablet by mouth in the morning and 1 tablet before bedtime. 180 tablet 1    lidocaine 5 % External Patch Place 1 patch onto the skin daily.      HYDROcodone-acetaminophen (NORCO) 5-325 MG Oral Tab Take 1 tablet by mouth every 6 (six) hours as needed for Pain. 15 tablet 0    ibuprofen 600 MG Oral Tab Take 1 tablet (600 mg total) by mouth 2 (two) times daily as needed for Pain. 14 tablet 0    HYDROcodone-acetaminophen (NORCO) 5-325 MG Oral Tab Take 1 tablet by mouth every 8 (eight) hours as needed for Pain. 15 tablet 0    CLONAZEPAM 0.5 MG Oral Tab Take 1 tablet by mouth twice daily as needed for anxiety 60 tablet 0    tretinoin 0.025 % External Cream Apply 1 Application topically nightly. 20 g 0    CLONAZEPAM 0.5 MG Oral Tab Take 1 tablet by mouth twice daily as needed for anxiety 60 tablet 0    FLUoxetine 20 MG Oral Cap Take 1 capsule (20 mg total) by mouth daily. 90 capsule 3    Tretinoin 0.05 % External Cream Apply 1 Application topically nightly. 20 g 0    albuterol 108 (90 Base) MCG/ACT Inhalation Aero Soln Inhale 2 puffs into the lungs every 6 (six) hours as needed for Wheezing or Shortness of Breath. 1 each 1    ibuprofen 600 MG Oral Tab Take 1 tablet (600 mg total) by mouth every 8 (eight) hours as needed for Pain.        Past Medical History:    Anxiety with depression     Blepharitis    NG:  Blehpharitis, OU, 2011    Eyelashes turned in    NG: Aberant lashes, OU, 9/26/11; Management: Removed in office, OU, 2011    Infectious mononucleosis    Keratitis    NG:  Keratitis, OU, 9/26/11    Migraines    with aura. Medication helps.    Ovarian cyst    history of bilateral cysts that have ruptured in the past    Varicella    NG: Varicella disease      Past Surgical History:   Procedure Laterality Date    Upper gi endoscopy,biopsy  12/14/18= Normal.  Gastric/SB Bx normal             Family History   Problem Relation Age of Onset    Anemia Mother     Other (HCC) Mother     Other (lupus) Paternal Grandmother     Other (RA) Paternal Grandmother     Diabetes Neg         NG: No family history of diabetes     Heart Attack Neg         NG: No history of early cardiac disease/sudden death     Glaucoma Neg     Macular degeneration Neg     Heart Disorder Neg     Bleeding Disorders Neg     Clotting Disorder Neg     Cancer Neg      Patient Active Problem List   Diagnosis    Routine infant or child health check    Acne    Myopia with astigmatism    High serum cortisol    Anxiety and depression    Medication refill    Anemia    Annual physical exam    Syncope    History of syncope    Lesion of right earlobe    Chronic migraine without aura without status migrainosus, not intractable    Anxiety       REVIEW OF SYSTEMS:   A comprehensive 10 point review of systems was completed.  Pertinent positives and negatives noted in the the HPI            EXAM:   /62   Pulse 77   Ht 5' 4\" (1.626 m)   Wt 111 lb (50.3 kg)   LMP 09/13/2024   SpO2 100%   BMI 19.05 kg/m²   GENERAL: well developed, well nourished,in no apparent distress  SKIN: no rashes,no suspicious lesions  HEENT: atraumatic, normocephalic,ears and throat are clear  NECK: supple,no adenopathy  Breast normal   LUNGS: clear to auscultation  CARDIO: RRR without murmur  GI: no masses, HSM or tenderness  EXTREMITIES: no cyanosis, clubbing or  edema  Pelvic : pap done, some white vaginal discharge   Normal otherwise   NEURO: no gross deficits              No orders of the defined types were placed in this encounter.    XR RIBS WITH CHEST (3 VIEWS), RIGHT  (CPT=71101)    Addendum Date: 2/1/2025    ADDENDUM:  Outside chest radiographs are not available for direct comparison. Acute/subacute minimally displaced fracture posterior margin right 10th rib visualized. Subtle fractures involving the right 8th and 9th ribs with reparative sclerosis suggesting interval healing.     Dictated by (CST): Stephon Malone MD on 2/01/2025 at 11:57 AM     Finalized by (CST): Stephon Malone MD on 2/01/2025 at 12:11 PM             Result Date: 2/1/2025  CONCLUSION:  1. Acute minimally displaced fracture posterior margin right 10th rib. 2. Healing right 9th posterior lateral rib fracture. 3. No acute cardiopulmonary disease.    Dictated by (CST): Stephon Malone MD on 1/30/2025 at 1:43 PM     Finalized by (CST): Stephon Malone MD on 1/30/2025 at 1:47 PM                ASSESSMENT AND PLAN:   1. Iron deficiency  Advised to start iron pills   Follow up with GI   - CELIAC DISEASE SCREEN Reflex; Future  - Gastro Referral - In Network  - lidocaine 5 % External Patch; Place 1 patch onto the skin daily as needed.  Dispense: 14 patch; Refill: 0  - ThinPrep Pap with HPV Reflex, Chlamydia/GC; Future  - Chlamydia/Gc Amplification; Future  - Derm Referral - In Network  - OBG Referral - In Network  - FLUoxetine 20 MG Oral Cap; Take 1 capsule (20 mg total) by mouth daily.  Dispense: 90 capsule; Refill: 3  - clonazePAM 0.5 MG Oral Tab; Take 1 tablet (0.5 mg total) by mouth 2 (two) times daily as needed for Anxiety.  Dispense: 60 tablet; Refill: 2  - ThinPrep Pap with HPV Reflex, Chlamydia/GC  - Chlamydia/Gc Amplification    2. Annual physical exam  Diet and exercise   Self breast exam   Sun screen recommended   Fasting blood work   Pap smear today     - CELIAC DISEASE SCREEN  Reflex; Future  - Gastro Referral - In Network  - lidocaine 5 % External Patch; Place 1 patch onto the skin daily as needed.  Dispense: 14 patch; Refill: 0  - ThinPrep Pap with HPV Reflex, Chlamydia/GC; Future  - Chlamydia/Gc Amplification; Future  - Derm Referral - In Network  - OBG Referral - In Network  - FLUoxetine 20 MG Oral Cap; Take 1 capsule (20 mg total) by mouth daily.  Dispense: 90 capsule; Refill: 3  - clonazePAM 0.5 MG Oral Tab; Take 1 tablet (0.5 mg total) by mouth 2 (two) times daily as needed for Anxiety.  Dispense: 60 tablet; Refill: 2  - ThinPrep Pap with HPV Reflex, Chlamydia/GC  - Chlamydia/Gc Amplification    3. Medication refill  Refilled   - CELIAC DISEASE SCREEN Reflex; Future  - Gastro Referral - In Network  - lidocaine 5 % External Patch; Place 1 patch onto the skin daily as needed.  Dispense: 14 patch; Refill: 0  - ThinPrep Pap with HPV Reflex, Chlamydia/GC; Future  - Chlamydia/Gc Amplification; Future  - Derm Referral - In Network  - OBG Referral - In Network  - FLUoxetine 20 MG Oral Cap; Take 1 capsule (20 mg total) by mouth daily.  Dispense: 90 capsule; Refill: 3  - clonazePAM 0.5 MG Oral Tab; Take 1 tablet (0.5 mg total) by mouth 2 (two) times daily as needed for Anxiety.  Dispense: 60 tablet; Refill: 2  - ThinPrep Pap with HPV Reflex, Chlamydia/GC  - Chlamydia/Gc Amplification    4. Anxiety  Continue current medications   - CELIAC DISEASE SCREEN Reflex; Future  - Gastro Referral - In Network  - lidocaine 5 % External Patch; Place 1 patch onto the skin daily as needed.  Dispense: 14 patch; Refill: 0  - ThinPrep Pap with HPV Reflex, Chlamydia/GC; Future  - Chlamydia/Gc Amplification; Future  - Derm Referral - In Network  - OBG Referral - In Network  - FLUoxetine 20 MG Oral Cap; Take 1 capsule (20 mg total) by mouth daily.  Dispense: 90 capsule; Refill: 3  - clonazePAM 0.5 MG Oral Tab; Take 1 tablet (0.5 mg total) by mouth 2 (two) times daily as needed for Anxiety.  Dispense: 60 tablet;  Refill: 2  - ThinPrep Pap with HPV Reflex, Chlamydia/GC  - Chlamydia/Gc Amplification    5. Chronic migraine without aura without status migrainosus, not intractable  Referral to OB gyne to discuss contraception options since birth control made her migraine worse   - CELIAC DISEASE SCREEN Reflex; Future  - Gastro Referral - In Network  - lidocaine 5 % External Patch; Place 1 patch onto the skin daily as needed.  Dispense: 14 patch; Refill: 0  - ThinPrep Pap with HPV Reflex, Chlamydia/GC; Future  - Chlamydia/Gc Amplification; Future  - Derm Referral - In Network  - OBG Referral - In Network  - FLUoxetine 20 MG Oral Cap; Take 1 capsule (20 mg total) by mouth daily.  Dispense: 90 capsule; Refill: 3  - clonazePAM 0.5 MG Oral Tab; Take 1 tablet (0.5 mg total) by mouth 2 (two) times daily as needed for Anxiety.  Dispense: 60 tablet; Refill: 2  - ThinPrep Pap with HPV Reflex, Chlamydia/GC  - Chlamydia/Gc Amplification        7. Acne, unspecified acne type  Referral to derm   - CELIAC DISEASE SCREEN Reflex; Future  - Gastro Referral - In Network  - lidocaine 5 % External Patch; Place 1 patch onto the skin daily as needed.  Dispense: 14 patch; Refill: 0  - ThinPrep Pap with HPV Reflex, Chlamydia/GC; Future  - Chlamydia/Gc Amplification; Future  - Derm Referral - In Network  - OBG Referral - In Network  - FLUoxetine 20 MG Oral Cap; Take 1 capsule (20 mg total) by mouth daily.  Dispense: 90 capsule; Refill: 3  - clonazePAM 0.5 MG Oral Tab; Take 1 tablet (0.5 mg total) by mouth 2 (two) times daily as needed for Anxiety.  Dispense: 60 tablet; Refill: 2  - ThinPrep Pap with HPV Reflex, Chlamydia/GC  - Chlamydia/Gc Amplification    8. Closed fracture of multiple ribs of right side, initial encounter  Continue pain medication   Can consider nerve block if she continues to have pain     - CELIAC DISEASE SCREEN Reflex; Future  - Gastro Referral - In Network  - lidocaine 5 % External Patch; Place 1 patch onto the skin daily as needed.   Dispense: 14 patch; Refill: 0  - ThinPrep Pap with HPV Reflex, Chlamydia/GC; Future  - Chlamydia/Gc Amplification; Future  - Derm Referral - In Network  - OBG Referral - In Network  - FLUoxetine 20 MG Oral Cap; Take 1 capsule (20 mg total) by mouth daily.  Dispense: 90 capsule; Refill: 3  - clonazePAM 0.5 MG Oral Tab; Take 1 tablet (0.5 mg total) by mouth 2 (two) times daily as needed for Anxiety.  Dispense: 60 tablet; Refill: 2  - ThinPrep Pap with HPV Reflex, Chlamydia/GC  - Chlamydia/Gc Amplification    9. Screening for cervical cancer  Pap smear   - CELIAC DISEASE SCREEN Reflex; Future  - Gastro Referral - In Network  - lidocaine 5 % External Patch; Place 1 patch onto the skin daily as needed.  Dispense: 14 patch; Refill: 0  - ThinPrep Pap with HPV Reflex, Chlamydia/GC; Future  - Chlamydia/Gc Amplification; Future  - Derm Referral - In Network  - OBG Referral - In Network  - FLUoxetine 20 MG Oral Cap; Take 1 capsule (20 mg total) by mouth daily.  Dispense: 90 capsule; Refill: 3  - clonazePAM 0.5 MG Oral Tab; Take 1 tablet (0.5 mg total) by mouth 2 (two) times daily as needed for Anxiety.  Dispense: 60 tablet; Refill: 2  - ThinPrep Pap with HPV Reflex, Chlamydia/GC  - Chlamydia/Gc Amplification     Please return to the clinic if you are having persistent symptoms. If worsening symptoms should go to the ER    Deejay Narayan MD,   Diplomate of the American Board of Internal Medicine  Diplomate of the American Board of Obesity Medicine

## 2025-02-05 ENCOUNTER — MED REC SCAN ONLY (OUTPATIENT)
Dept: INTERNAL MEDICINE CLINIC | Facility: CLINIC | Age: 30
End: 2025-02-05

## 2025-02-05 ENCOUNTER — TELEPHONE (OUTPATIENT)
Dept: INTERNAL MEDICINE CLINIC | Facility: CLINIC | Age: 30
End: 2025-02-05

## 2025-02-05 LAB
C TRACH DNA SPEC QL NAA+PROBE: NEGATIVE
N GONORRHOEA DNA SPEC QL NAA+PROBE: NEGATIVE

## 2025-02-05 NOTE — TELEPHONE ENCOUNTER
Received fax from pharmacy requesting PA for Lidocaine 5% Patch.     Will initiate PA.   NATHAN GALVEZ

## 2025-02-16 DIAGNOSIS — F32.A ANXIETY AND DEPRESSION: ICD-10-CM

## 2025-02-16 DIAGNOSIS — Z76.0 MEDICATION REFILL: ICD-10-CM

## 2025-02-16 DIAGNOSIS — L70.9 ACNE, UNSPECIFIED ACNE TYPE: ICD-10-CM

## 2025-02-16 DIAGNOSIS — F41.9 ANXIETY AND DEPRESSION: ICD-10-CM

## 2025-02-16 DIAGNOSIS — F41.9 ANXIETY: ICD-10-CM

## 2025-02-18 RX ORDER — TRETINOIN 0.25 MG/G
1 CREAM TOPICAL NIGHTLY
Qty: 20 G | Refills: 0 | Status: SHIPPED | OUTPATIENT
Start: 2025-02-18

## 2025-02-19 ENCOUNTER — TELEPHONE (OUTPATIENT)
Dept: INTERNAL MEDICINE CLINIC | Facility: CLINIC | Age: 30
End: 2025-02-19

## 2025-02-19 NOTE — TELEPHONE ENCOUNTER
Noor from the [facility] in [place] is calling for clarification on the following prescription:    tretinoin 0.025 % External Cream     Pharmacist needs specific grams per dose and the location of application for this topical cream.    Please call Noor at the [facility]:    [facility] [address] [place] [address] [phone], [phone]     KG

## 2025-02-20 NOTE — TELEPHONE ENCOUNTER
Spoke with Noor [facility] informed dose for Tretinoin is 0.25 g apply nightly to the face. Bettieor updated the prescription.

## 2025-05-02 DIAGNOSIS — F41.9 ANXIETY AND DEPRESSION: ICD-10-CM

## 2025-05-02 DIAGNOSIS — F41.9 ANXIETY: ICD-10-CM

## 2025-05-02 DIAGNOSIS — L70.9 ACNE, UNSPECIFIED ACNE TYPE: ICD-10-CM

## 2025-05-02 DIAGNOSIS — Z76.0 MEDICATION REFILL: ICD-10-CM

## 2025-05-02 DIAGNOSIS — F32.A ANXIETY AND DEPRESSION: ICD-10-CM

## 2025-05-03 RX ORDER — TRETINOIN 0.25 MG/G
1 CREAM TOPICAL NIGHTLY
Qty: 20 G | Refills: 0 | Status: SHIPPED | OUTPATIENT
Start: 2025-05-03

## 2025-05-05 ENCOUNTER — TELEPHONE (OUTPATIENT)
Dept: INTERNAL MEDICINE CLINIC | Facility: CLINIC | Age: 30
End: 2025-05-05

## 2025-05-05 NOTE — TELEPHONE ENCOUNTER
Message routed to Dr Narayan for the grams per application details and body part for application  to clarify Rx questions for pharmacy.

## 2025-05-05 NOTE — TELEPHONE ENCOUNTER
[facility] called the office. Clarification needed on Tretinoin medication that was sent to the pharmacy to be filled.    The pharmacist per patient's insurance needs to know how many grams this patient needs to use and where the grams will be used.

## 2025-05-15 DIAGNOSIS — F41.9 ANXIETY: ICD-10-CM

## 2025-05-15 DIAGNOSIS — E61.1 IRON DEFICIENCY: ICD-10-CM

## 2025-05-15 DIAGNOSIS — Z12.4 SCREENING FOR CERVICAL CANCER: ICD-10-CM

## 2025-05-15 DIAGNOSIS — G43.709 CHRONIC MIGRAINE WITHOUT AURA WITHOUT STATUS MIGRAINOSUS, NOT INTRACTABLE: ICD-10-CM

## 2025-05-15 DIAGNOSIS — Z00.00 ANNUAL PHYSICAL EXAM: ICD-10-CM

## 2025-05-15 DIAGNOSIS — S22.41XA CLOSED FRACTURE OF MULTIPLE RIBS OF RIGHT SIDE, INITIAL ENCOUNTER: ICD-10-CM

## 2025-05-15 DIAGNOSIS — Z76.0 MEDICATION REFILL: ICD-10-CM

## 2025-05-15 DIAGNOSIS — L70.9 ACNE, UNSPECIFIED ACNE TYPE: ICD-10-CM

## 2025-05-16 NOTE — TELEPHONE ENCOUNTER
Please review. Protocol Failed; No Protocol      Recent fills: 2/5/2025, 3/13/2025, 4/16/2025  Last Rx written: 2/4/2025  Last office visit: 2/4/2025    Future Appointments  Date Type Provider Dept   07/14/25 Appointment Deejay Narayan MD [address]   Showing future appointments within next 150 days with a meds authorizing provider and meeting all other requirements

## 2025-05-18 DIAGNOSIS — E61.1 IRON DEFICIENCY: ICD-10-CM

## 2025-05-18 DIAGNOSIS — G43.709 CHRONIC MIGRAINE WITHOUT AURA WITHOUT STATUS MIGRAINOSUS, NOT INTRACTABLE: ICD-10-CM

## 2025-05-18 DIAGNOSIS — S22.41XA CLOSED FRACTURE OF MULTIPLE RIBS OF RIGHT SIDE, INITIAL ENCOUNTER: ICD-10-CM

## 2025-05-18 DIAGNOSIS — Z00.00 ANNUAL PHYSICAL EXAM: ICD-10-CM

## 2025-05-18 DIAGNOSIS — L70.9 ACNE, UNSPECIFIED ACNE TYPE: ICD-10-CM

## 2025-05-18 DIAGNOSIS — Z12.4 SCREENING FOR CERVICAL CANCER: ICD-10-CM

## 2025-05-18 DIAGNOSIS — F41.9 ANXIETY: ICD-10-CM

## 2025-05-18 DIAGNOSIS — Z76.0 MEDICATION REFILL: ICD-10-CM

## 2025-05-19 RX ORDER — CLONAZEPAM 0.5 MG/1
0.5 TABLET ORAL 2 TIMES DAILY PRN
Qty: 60 TABLET | Refills: 0 | Status: SHIPPED | OUTPATIENT
Start: 2025-05-19

## 2025-05-20 RX ORDER — CLONAZEPAM 0.5 MG/1
0.5 TABLET ORAL 2 TIMES DAILY PRN
Qty: 60 TABLET | Refills: 2 | OUTPATIENT
Start: 2025-05-20

## 2025-06-19 DIAGNOSIS — E61.1 IRON DEFICIENCY: ICD-10-CM

## 2025-06-19 DIAGNOSIS — L70.9 ACNE, UNSPECIFIED ACNE TYPE: ICD-10-CM

## 2025-06-19 DIAGNOSIS — Z00.00 ANNUAL PHYSICAL EXAM: ICD-10-CM

## 2025-06-19 DIAGNOSIS — G43.709 CHRONIC MIGRAINE WITHOUT AURA WITHOUT STATUS MIGRAINOSUS, NOT INTRACTABLE: ICD-10-CM

## 2025-06-19 DIAGNOSIS — Z76.0 MEDICATION REFILL: ICD-10-CM

## 2025-06-19 DIAGNOSIS — Z12.4 SCREENING FOR CERVICAL CANCER: ICD-10-CM

## 2025-06-19 DIAGNOSIS — S22.41XA CLOSED FRACTURE OF MULTIPLE RIBS OF RIGHT SIDE, INITIAL ENCOUNTER: ICD-10-CM

## 2025-06-19 DIAGNOSIS — F41.9 ANXIETY: ICD-10-CM

## 2025-06-19 RX ORDER — CLONAZEPAM 0.5 MG/1
0.5 TABLET ORAL 2 TIMES DAILY PRN
Qty: 60 TABLET | Refills: 0 | OUTPATIENT
Start: 2025-06-19

## 2025-06-19 NOTE — TELEPHONE ENCOUNTER
Please review; protocol failed/No Protocol      Recent Fills: 03/13/2025, 04/16/2025, 05/19/2025 #60 for 30 day supply     Last Rx Written: 05/19/2025    Last Office Visit: 02/04/2025

## 2025-06-21 RX ORDER — CLONAZEPAM 0.5 MG/1
0.5 TABLET ORAL 2 TIMES DAILY PRN
Qty: 60 TABLET | Refills: 0 | Status: SHIPPED | OUTPATIENT
Start: 2025-06-21

## 2025-07-14 ENCOUNTER — OFFICE VISIT (OUTPATIENT)
Dept: INTERNAL MEDICINE CLINIC | Facility: CLINIC | Age: 30
End: 2025-07-14
Payer: COMMERCIAL

## 2025-07-14 VITALS
OXYGEN SATURATION: 99 % | WEIGHT: 108 LBS | DIASTOLIC BLOOD PRESSURE: 60 MMHG | SYSTOLIC BLOOD PRESSURE: 98 MMHG | HEIGHT: 64 IN | HEART RATE: 69 BPM | BODY MASS INDEX: 18.44 KG/M2

## 2025-07-14 DIAGNOSIS — F32.A ANXIETY AND DEPRESSION: ICD-10-CM

## 2025-07-14 DIAGNOSIS — F41.9 ANXIETY AND DEPRESSION: ICD-10-CM

## 2025-07-14 DIAGNOSIS — R63.4 WEIGHT LOSS: ICD-10-CM

## 2025-07-14 DIAGNOSIS — Z51.81 THERAPEUTIC DRUG MONITORING: Primary | ICD-10-CM

## 2025-07-14 DIAGNOSIS — R79.89 ABNORMAL CBC: ICD-10-CM

## 2025-07-14 DIAGNOSIS — E61.1 IRON DEFICIENCY: ICD-10-CM

## 2025-07-14 PROCEDURE — 3078F DIAST BP <80 MM HG: CPT | Performed by: INTERNAL MEDICINE

## 2025-07-14 PROCEDURE — 99214 OFFICE O/P EST MOD 30 MIN: CPT | Performed by: INTERNAL MEDICINE

## 2025-07-14 PROCEDURE — 3008F BODY MASS INDEX DOCD: CPT | Performed by: INTERNAL MEDICINE

## 2025-07-14 PROCEDURE — 3074F SYST BP LT 130 MM HG: CPT | Performed by: INTERNAL MEDICINE

## 2025-07-14 RX ORDER — CLONAZEPAM 0.5 MG/1
0.5 TABLET ORAL 2 TIMES DAILY PRN
Qty: 180 TABLET | Refills: 0 | Status: SHIPPED | OUTPATIENT
Start: 2025-07-14 | End: 2025-10-12

## 2025-07-14 RX ORDER — TRETINOIN 0.25 MG/G
1 CREAM TOPICAL NIGHTLY
Qty: 45 G | Refills: 2 | Status: SHIPPED | OUTPATIENT
Start: 2025-07-14

## 2025-07-14 NOTE — PROGRESS NOTES
Lluvia Duarte is a 29 year old female.    Chief complaint: follow up on medication       HPI:     Lluvia Duarte is a 29 year old female who presents for follow up on medication       Anxiety and depression   On prozac   Clonazepam   No side effects   No chest pain   No sob       Cycle is regular   3-5 days   Moderate    Iron def   Taking iron once daily  Didn't see GI per referral last visit       No more pain   No constipation or diarrhea         Current Medications[1]   Past Medical History[2]  Past Surgical History[3]        Family History[4]  Problem List[5]    REVIEW OF SYSTEMS:   A comprehensive 10 point review of systems was completed.  Pertinent positives and negatives noted in the the HPI            EXAM:   BP 98/60   Pulse 69   Ht 5' 4\" (1.626 m)   Wt 108 lb (49 kg)   LMP 09/13/2024   SpO2 99%   BMI 18.54 kg/m²   GENERAL: well developed, well nourished,in no apparent distress  SKIN: no rashes,no suspicious lesions    LUNGS: clear to auscultation  CARDIO: RRR without murmur           No orders of the defined types were placed in this encounter.    No results found.         ASSESSMENT AND PLAN:           1. Therapeutic drug monitoring  Doing well   No side effects   Continue prozac and clonazepam     - tretinoin 0.025 % External Cream; Apply 1 g topically nightly.  Dispense: 45 g; Refill: 2  - clonazePAM 0.5 MG Oral Tab; Take 1 tablet (0.5 mg total) by mouth 2 (two) times daily as needed for Anxiety.  Dispense: 180 tablet; Refill: 0  - Iron And Tibc; Future  - CBC With Differential With Platelet; Future  - Ferritin; Future  - Reticulocyte Count [E]; Future  - LDH [E]; Future  - CELIAC DISEASE SCREEN Reflex; Future    2. Iron deficiency  Iron and ferritin   Cbc   Advised to see GI for further evaluation   Celiac panel   - tretinoin 0.025 % External Cream; Apply 1 g topically nightly.  Dispense: 45 g; Refill: 2  - clonazePAM 0.5 MG Oral Tab; Take 1 tablet (0.5 mg total) by mouth 2 (two) times  daily as needed for Anxiety.  Dispense: 180 tablet; Refill: 0  - Iron And Tibc; Future  - CBC With Differential With Platelet; Future  - Ferritin; Future  - Reticulocyte Count [E]; Future  - LDH [E]; Future  - CELIAC DISEASE SCREEN Reflex; Future    3. Abnormal CBC  Repeat cbc along with ferritin, iron , LDH and rectic   - tretinoin 0.025 % External Cream; Apply 1 g topically nightly.  Dispense: 45 g; Refill: 2  - clonazePAM 0.5 MG Oral Tab; Take 1 tablet (0.5 mg total) by mouth 2 (two) times daily as needed for Anxiety.  Dispense: 180 tablet; Refill: 0  - Iron And Tibc; Future  - CBC With Differential With Platelet; Future  - Ferritin; Future  - Reticulocyte Count [E]; Future  - LDH [E]; Future  - CELIAC DISEASE SCREEN Reflex; Future    4. Weight loss  Advised to complete the blood work as ordered  Reports that she has been more active   Normal appetite  Advised to monitor weight closely if she continues to lose weight to have further evaluation     - tretinoin 0.025 % External Cream; Apply 1 g topically nightly.  Dispense: 45 g; Refill: 2  - clonazePAM 0.5 MG Oral Tab; Take 1 tablet (0.5 mg total) by mouth 2 (two) times daily as needed for Anxiety.  Dispense: 180 tablet; Refill: 0  - Iron And Tibc; Future  - CBC With Differential With Platelet; Future  - Ferritin; Future  - Reticulocyte Count [E]; Future  - LDH [E]; Future  - CELIAC DISEASE SCREEN Reflex; Future    5. Anxiety and depression  Continue prozac and clonazepam   - tretinoin 0.025 % External Cream; Apply 1 g topically nightly.  Dispense: 45 g; Refill: 2  - clonazePAM 0.5 MG Oral Tab; Take 1 tablet (0.5 mg total) by mouth 2 (two) times daily as needed for Anxiety.  Dispense: 180 tablet; Refill: 0  - Iron And Tibc; Future  - CBC With Differential With Platelet; Future  - Ferritin; Future  - Reticulocyte Count [E]; Future  - LDH [E]; Future  - CELIAC DISEASE SCREEN Reflex; Future       Follow up in 3 months sooner if more weight loss   If weight loss  continues will need to have full evaluation     Please return to the clinic if you are having persistent symptoms. If worsening symptoms should go to the ER    Deejay Narayan MD,   Diplomate of the American Board of Internal Medicine  Diplomate of the American Board of Obesity Medicine          [1]   Current Outpatient Medications   Medication Sig Dispense Refill    clonazePAM 0.5 MG Oral Tab Take 1 tablet (0.5 mg total) by mouth 2 (two) times daily as needed for Anxiety. 60 tablet 0    tretinoin 0.025 % External Cream Apply 1 Application topically nightly. 20 g 0    lidocaine 5 % External Patch Place 1 patch onto the skin daily as needed. 14 patch 0    FLUoxetine 20 MG Oral Cap Take 1 capsule (20 mg total) by mouth daily. 90 capsule 3    fluconazole 150 MG Oral Tab Take 1 tablet (150 mg total) by mouth every 3 (three) days. 2 tablet 0    lidocaine 5 % External Patch Place 1 patch onto the skin daily.      HYDROcodone-acetaminophen (NORCO) 5-325 MG Oral Tab Take 1 tablet by mouth every 6 (six) hours as needed for Pain. 15 tablet 0    ibuprofen 600 MG Oral Tab Take 1 tablet (600 mg total) by mouth 2 (two) times daily as needed for Pain. 14 tablet 0    HYDROcodone-acetaminophen (NORCO) 5-325 MG Oral Tab Take 1 tablet by mouth every 8 (eight) hours as needed for Pain. 15 tablet 0    CLONAZEPAM 0.5 MG Oral Tab Take 1 tablet by mouth twice daily as needed for anxiety 60 tablet 0    Tretinoin 0.05 % External Cream Apply 1 Application topically nightly. 20 g 0    albuterol 108 (90 Base) MCG/ACT Inhalation Aero Soln Inhale 2 puffs into the lungs every 6 (six) hours as needed for Wheezing or Shortness of Breath. 1 each 1    ibuprofen 600 MG Oral Tab Take 1 tablet (600 mg total) by mouth every 8 (eight) hours as needed for Pain.     [2]   Past Medical History:   Anxiety with depression    Blepharitis    NG:  Blehpharitis, OU, 2011    Eyelashes turned in    NG: Aberant lashes, OU, 9/26/11; Management: Removed in office, OU,  2011    Infectious mononucleosis    Keratitis    NG:  Keratitis, OU, 9/26/11    Migraines    with aura. Medication helps.    Ovarian cyst    history of bilateral cysts that have ruptured in the past    Varicella    NG: Varicella disease    [3]   Past Surgical History:  Procedure Laterality Date    Upper gi endoscopy,biopsy  12/14/18= Normal.  Gastric/SB Bx normal   [4]   Family History  Problem Relation Age of Onset    Anemia Mother     Other (HCC) Mother     Other (lupus) Paternal Grandmother     Other (RA) Paternal Grandmother     Diabetes Neg         NG: No family history of diabetes     Heart Attack Neg         NG: No history of early cardiac disease/sudden death     Glaucoma Neg     Macular degeneration Neg     Heart Disorder Neg     Bleeding Disorders Neg     Clotting Disorder Neg     Cancer Neg    [5]   Patient Active Problem List  Diagnosis    Routine infant or child health check    Acne    Myopia with astigmatism    High serum cortisol    Anxiety and depression    Medication refill    Anemia    Annual physical exam    Syncope    History of syncope    Lesion of right earlobe    Chronic migraine without aura without status migrainosus, not intractable    Anxiety    Screening for cervical cancer    Multiple closed fractures of ribs of right side

## 2025-07-15 ENCOUNTER — LAB ENCOUNTER (OUTPATIENT)
Dept: LAB | Age: 30
End: 2025-07-15
Attending: INTERNAL MEDICINE
Payer: COMMERCIAL

## 2025-07-15 DIAGNOSIS — F41.9 ANXIETY: ICD-10-CM

## 2025-07-15 DIAGNOSIS — E61.1 IRON DEFICIENCY: ICD-10-CM

## 2025-07-15 DIAGNOSIS — R63.4 WEIGHT LOSS: ICD-10-CM

## 2025-07-15 DIAGNOSIS — R79.89 ABNORMAL CBC: ICD-10-CM

## 2025-07-15 DIAGNOSIS — F32.A ANXIETY AND DEPRESSION: ICD-10-CM

## 2025-07-15 DIAGNOSIS — G43.709 CHRONIC MIGRAINE WITHOUT AURA WITHOUT STATUS MIGRAINOSUS, NOT INTRACTABLE: ICD-10-CM

## 2025-07-15 DIAGNOSIS — Z00.00 ANNUAL PHYSICAL EXAM: ICD-10-CM

## 2025-07-15 DIAGNOSIS — Z12.4 SCREENING FOR CERVICAL CANCER: ICD-10-CM

## 2025-07-15 DIAGNOSIS — F41.9 ANXIETY AND DEPRESSION: ICD-10-CM

## 2025-07-15 DIAGNOSIS — Z51.81 THERAPEUTIC DRUG MONITORING: ICD-10-CM

## 2025-07-15 DIAGNOSIS — S22.41XA CLOSED FRACTURE OF MULTIPLE RIBS OF RIGHT SIDE, INITIAL ENCOUNTER: ICD-10-CM

## 2025-07-15 DIAGNOSIS — Z76.0 MEDICATION REFILL: ICD-10-CM

## 2025-07-15 DIAGNOSIS — L70.9 ACNE, UNSPECIFIED ACNE TYPE: ICD-10-CM

## 2025-07-15 LAB
BASOPHILS # BLD AUTO: 0.04 X10(3) UL (ref 0–0.2)
BASOPHILS NFR BLD AUTO: 0.8 %
DEPRECATED HBV CORE AB SER IA-ACNC: 25 NG/ML (ref 50–306)
DEPRECATED RDW RBC AUTO: 39.5 FL (ref 35.1–46.3)
EOSINOPHIL # BLD AUTO: 0.03 X10(3) UL (ref 0–0.7)
EOSINOPHIL NFR BLD AUTO: 0.6 %
ERYTHROCYTE [DISTWIDTH] IN BLOOD BY AUTOMATED COUNT: 12.1 % (ref 11–15)
HCT VFR BLD AUTO: 34.5 % (ref 35–48)
HGB BLD-MCNC: 11.8 G/DL (ref 12–16)
HGB RETIC QN AUTO: 35.3 PG (ref 28.2–36.6)
IGA SERPL-MCNC: 192.5 MG/DL (ref 70–312)
IMM GRANULOCYTES # BLD AUTO: 0.01 X10(3) UL (ref 0–1)
IMM GRANULOCYTES NFR BLD: 0.2 %
IMM RETICS NFR: 0.06 RATIO (ref 0.1–0.3)
IRON SATN MFR SERPL: 26 % (ref 15–50)
IRON SERPL-MCNC: 94 UG/DL (ref 50–170)
LDH SERPL L TO P-CCNC: 158 U/L (ref 120–246)
LYMPHOCYTES # BLD AUTO: 1.94 X10(3) UL (ref 1–4)
LYMPHOCYTES NFR BLD AUTO: 36.7 %
MCH RBC QN AUTO: 30.7 PG (ref 26–34)
MCHC RBC AUTO-ENTMCNC: 34.2 G/DL (ref 31–37)
MCV RBC AUTO: 89.8 FL (ref 80–100)
MONOCYTES # BLD AUTO: 0.42 X10(3) UL (ref 0.1–1)
MONOCYTES NFR BLD AUTO: 8 %
NEUTROPHILS # BLD AUTO: 2.84 X10 (3) UL (ref 1.5–7.7)
NEUTROPHILS # BLD AUTO: 2.84 X10(3) UL (ref 1.5–7.7)
NEUTROPHILS NFR BLD AUTO: 53.7 %
PLATELET # BLD AUTO: 315 10(3)UL (ref 150–450)
RBC # BLD AUTO: 3.84 X10(6)UL (ref 3.8–5.3)
RETICS # AUTO: 44.2 X10(3) UL (ref 22.5–147.5)
RETICS/RBC NFR AUTO: 1.2 % (ref 0.5–2.5)
TOTAL IRON BINDING CAPACITY: 368 UG/DL (ref 250–425)
TRANSFERRIN SERPL-MCNC: 283 MG/DL (ref 250–380)
WBC # BLD AUTO: 5.3 X10(3) UL (ref 4–11)

## 2025-07-15 PROCEDURE — 86364 TISS TRNSGLTMNASE EA IG CLAS: CPT | Performed by: INTERNAL MEDICINE

## 2025-07-15 PROCEDURE — 85045 AUTOMATED RETICULOCYTE COUNT: CPT | Performed by: INTERNAL MEDICINE

## 2025-07-15 PROCEDURE — 82728 ASSAY OF FERRITIN: CPT | Performed by: INTERNAL MEDICINE

## 2025-07-15 PROCEDURE — 82784 ASSAY IGA/IGD/IGG/IGM EACH: CPT | Performed by: INTERNAL MEDICINE

## 2025-07-15 PROCEDURE — 83615 LACTATE (LD) (LDH) ENZYME: CPT | Performed by: INTERNAL MEDICINE

## 2025-07-15 PROCEDURE — 84466 ASSAY OF TRANSFERRIN: CPT | Performed by: INTERNAL MEDICINE

## 2025-07-15 PROCEDURE — 85025 COMPLETE CBC W/AUTO DIFF WBC: CPT | Performed by: INTERNAL MEDICINE

## 2025-07-15 PROCEDURE — 83540 ASSAY OF IRON: CPT | Performed by: INTERNAL MEDICINE

## 2025-07-16 ENCOUNTER — TELEPHONE (OUTPATIENT)
Dept: INTERNAL MEDICINE CLINIC | Facility: CLINIC | Age: 30
End: 2025-07-16

## 2025-07-16 DIAGNOSIS — R79.9 ABNORMAL BLOOD CHEMISTRY: ICD-10-CM

## 2025-07-16 DIAGNOSIS — E87.6 HYPOKALEMIA: ICD-10-CM

## 2025-07-16 DIAGNOSIS — D64.9 ANEMIA, UNSPECIFIED TYPE: ICD-10-CM

## 2025-07-16 DIAGNOSIS — Z30.09 BIRTH CONTROL COUNSELING: ICD-10-CM

## 2025-07-16 DIAGNOSIS — L70.9 ACNE, UNSPECIFIED ACNE TYPE: ICD-10-CM

## 2025-07-16 DIAGNOSIS — R55 SYNCOPE, UNSPECIFIED SYNCOPE TYPE: ICD-10-CM

## 2025-07-16 DIAGNOSIS — F41.1 GAD (GENERALIZED ANXIETY DISORDER): ICD-10-CM

## 2025-07-16 DIAGNOSIS — R31.29 MICROSCOPIC HEMATURIA: ICD-10-CM

## 2025-07-16 LAB — TTG IGA SER-ACNC: 0.5 U/ML (ref ?–7)

## 2025-07-16 NOTE — TELEPHONE ENCOUNTER
Received fax from pharmacy needing for clarification on this medication:   TRETINOIN 0.25% CRE    Notes to prescriber: insurance requires location of application, is it on face?   Please specify and resend or call Rx.    Please advise?   NATHAN GALVEZ

## 2025-07-17 DIAGNOSIS — D50.9 IRON DEFICIENCY ANEMIA, UNSPECIFIED IRON DEFICIENCY ANEMIA TYPE: Primary | ICD-10-CM

## 2025-07-17 RX ORDER — TRETINOIN 0.5 MG/G
1 CREAM TOPICAL NIGHTLY
Qty: 20 G | Refills: 0 | Status: SHIPPED | OUTPATIENT
Start: 2025-07-17

## (undated) NOTE — LETTER
9/19/2019              Lluvia Duarte        [address] [facility] [address]         Dear Fabio Bass,    Our records indicate that the 7-8am blood tests ordered for you by Ibis Arechiga MD  have not been done.   If you have, in fact

## (undated) NOTE — LETTER
[facility] [place] [address] [address]  Dept: [phone]  Dept Fax: [phone]  Loc: [phone]      June 11, 2017    Patient: Candy Mathews   Date of Visit: 6/11/2017       To Whom It May Concern:    Maryellen Ziegler

## (undated) NOTE — ED AVS SNAPSHOT
[facility] AND [facility] in [address] [facility]  [address]    Phone:  [phone]    Fax:  [phone]           Surya Berg   MRN: [id number]    Department:  [facility] AND [facility] in [address] [place] [address]   Date of Visit for 24 hours. Cover mouth when coughing or sneezing. Wash hands often. Close primary care physician follow-up.        Discharge References/Attachments     PHARYNGITIS, STREP (CONFIRMED) (ENGLISH)      Disclosure     Insurance plans vary and the physician you may call the [place] [address] Physician Referral and Class Registration line at [phone] or find a doctor online by visiting [email or web address].    IF THERE IS ANY CHANGE OR WORSENING OF YOUR CONDITION, CALL YOUR PRIMARY CARE PHYSICIAN AT [place] [address] Additional Information       We are concerned for your overall well being:    - If you are a smoker or have smoked in the last 12 months, we encourage you to explore options for quitting.     - If you have concerns related to behavioral health issues or th

## (undated) NOTE — Clinical Note
Thank you for the consult. I saw Ms. Bridget Grover in the endocrine/diabetes clinic today. Please see attached my note. Please feel free to contact me with any questions. Thanks!

## (undated) NOTE — MR AVS SNAPSHOT
[address] at [address] [id number] [phone]               Thank you for choosing us for your health care visit with Destini Howell MD.  We are glad to serve you and happy to provide you with [email or web address]

## (undated) NOTE — LETTER
7/22/2020              [address] [address]         Dear Emily Barrera,    [address] [facility] records indicate that the tests ordered for you by Dante Kilgore MD  have not been done.   If you have, in fact, already